# Patient Record
Sex: FEMALE | Race: WHITE | Employment: OTHER | URBAN - NONMETROPOLITAN AREA
[De-identification: names, ages, dates, MRNs, and addresses within clinical notes are randomized per-mention and may not be internally consistent; named-entity substitution may affect disease eponyms.]

---

## 2021-01-10 ENCOUNTER — APPOINTMENT (OUTPATIENT)
Dept: CT IMAGING | Age: 76
DRG: 853 | End: 2021-01-10
Payer: MEDICARE

## 2021-01-10 ENCOUNTER — HOSPITAL ENCOUNTER (INPATIENT)
Age: 76
LOS: 4 days | Discharge: HOME | DRG: 853 | End: 2021-01-14
Attending: FAMILY MEDICINE | Admitting: FAMILY MEDICINE
Payer: MEDICARE

## 2021-01-10 ENCOUNTER — APPOINTMENT (OUTPATIENT)
Dept: GENERAL RADIOLOGY | Age: 76
DRG: 853 | End: 2021-01-10
Payer: MEDICARE

## 2021-01-10 DIAGNOSIS — N39.0 URINARY TRACT INFECTION WITH HEMATURIA, SITE UNSPECIFIED: Primary | ICD-10-CM

## 2021-01-10 DIAGNOSIS — R41.82 ALTERED MENTAL STATUS, UNSPECIFIED ALTERED MENTAL STATUS TYPE: ICD-10-CM

## 2021-01-10 DIAGNOSIS — N23 RENAL COLIC: ICD-10-CM

## 2021-01-10 DIAGNOSIS — R50.9 FEVER, UNSPECIFIED FEVER CAUSE: ICD-10-CM

## 2021-01-10 DIAGNOSIS — R31.9 URINARY TRACT INFECTION WITH HEMATURIA, SITE UNSPECIFIED: Primary | ICD-10-CM

## 2021-01-10 PROBLEM — A41.9 SEPSIS (HCC): Status: ACTIVE | Noted: 2021-01-10

## 2021-01-10 LAB
ALBUMIN SERPL-MCNC: 4.2 G/DL (ref 3.5–5.1)
ALP BLD-CCNC: 172 U/L (ref 38–126)
ALT SERPL-CCNC: 97 U/L (ref 11–66)
ANION GAP SERPL CALCULATED.3IONS-SCNC: 14 MEQ/L (ref 8–16)
AST SERPL-CCNC: 116 U/L (ref 5–40)
BACTERIA: ABNORMAL
BILIRUB SERPL-MCNC: 0.7 MG/DL (ref 0.3–1.2)
BILIRUBIN URINE: NEGATIVE
BLOOD, URINE: ABNORMAL
BUN BLDV-MCNC: 30 MG/DL (ref 7–22)
CALCIUM SERPL-MCNC: 10.1 MG/DL (ref 8.5–10.5)
CASTS: ABNORMAL /LPF
CASTS: ABNORMAL /LPF
CHARACTER, URINE: ABNORMAL
CHLORIDE BLD-SCNC: 98 MEQ/L (ref 98–111)
CO2: 24 MEQ/L (ref 23–33)
COLOR: YELLOW
CREAT SERPL-MCNC: 1.6 MG/DL (ref 0.4–1.2)
CRYSTALS: ABNORMAL
EKG ATRIAL RATE: 109 BPM
EKG P AXIS: 71 DEGREES
EKG P-R INTERVAL: 140 MS
EKG Q-T INTERVAL: 302 MS
EKG QRS DURATION: 88 MS
EKG QTC CALCULATION (BAZETT): 406 MS
EKG R AXIS: 6 DEGREES
EKG T AXIS: 70 DEGREES
EKG VENTRICULAR RATE: 109 BPM
EPITHELIAL CELLS, UA: ABNORMAL /HPF
GFR SERPL CREATININE-BSD FRML MDRD: 31 ML/MIN/1.73M2
GLUCOSE BLD-MCNC: 113 MG/DL (ref 70–108)
GLUCOSE, URINE: NEGATIVE MG/DL
KETONES, URINE: NEGATIVE
LACTIC ACID, SEPSIS: 2.1 MMOL/L (ref 0.5–1.9)
LACTIC ACID, SEPSIS: 2.1 MMOL/L (ref 0.5–1.9)
LEUKOCYTE EST, POC: ABNORMAL
MISCELLANEOUS LAB TEST RESULT: ABNORMAL
NITRITE, URINE: NEGATIVE
OSMOLALITY CALCULATION: 279 MOSMOL/KG (ref 275–300)
PH UA: 5 (ref 5–9)
POTASSIUM REFLEX MAGNESIUM: 4.2 MEQ/L (ref 3.5–5.2)
PRO-BNP: 4074 PG/ML (ref 0–900)
PROCALCITONIN: 50.18 NG/ML (ref 0.01–0.09)
PROTEIN UA: 30 MG/DL
RBC URINE: ABNORMAL /HPF
RENAL EPITHELIAL, UA: ABNORMAL
SARS-COV-2, NAAT: NOT DETECTED
SCAN OF BLOOD SMEAR: NORMAL
SODIUM BLD-SCNC: 136 MEQ/L (ref 135–145)
SPECIFIC GRAVITY UA: 1.01 (ref 1–1.03)
TOTAL PROTEIN: 7 G/DL (ref 6.1–8)
TROPONIN T: < 0.01 NG/ML
UROBILINOGEN, URINE: 0.2 EU/DL (ref 0–1)
WBC UA: ABNORMAL /HPF
YEAST: ABNORMAL

## 2021-01-10 PROCEDURE — 83880 ASSAY OF NATRIURETIC PEPTIDE: CPT

## 2021-01-10 PROCEDURE — 81001 URINALYSIS AUTO W/SCOPE: CPT

## 2021-01-10 PROCEDURE — 87077 CULTURE AEROBIC IDENTIFY: CPT

## 2021-01-10 PROCEDURE — 96375 TX/PRO/DX INJ NEW DRUG ADDON: CPT

## 2021-01-10 PROCEDURE — 84145 PROCALCITONIN (PCT): CPT

## 2021-01-10 PROCEDURE — 87186 SC STD MICRODIL/AGAR DIL: CPT

## 2021-01-10 PROCEDURE — 6370000000 HC RX 637 (ALT 250 FOR IP): Performed by: FAMILY MEDICINE

## 2021-01-10 PROCEDURE — 83605 ASSAY OF LACTIC ACID: CPT

## 2021-01-10 PROCEDURE — 93005 ELECTROCARDIOGRAM TRACING: CPT | Performed by: FAMILY MEDICINE

## 2021-01-10 PROCEDURE — 96365 THER/PROPH/DIAG IV INF INIT: CPT

## 2021-01-10 PROCEDURE — 87040 BLOOD CULTURE FOR BACTERIA: CPT

## 2021-01-10 PROCEDURE — 2580000003 HC RX 258: Performed by: STUDENT IN AN ORGANIZED HEALTH CARE EDUCATION/TRAINING PROGRAM

## 2021-01-10 PROCEDURE — 87086 URINE CULTURE/COLONY COUNT: CPT

## 2021-01-10 PROCEDURE — 6360000002 HC RX W HCPCS: Performed by: FAMILY MEDICINE

## 2021-01-10 PROCEDURE — 74176 CT ABD & PELVIS W/O CONTRAST: CPT

## 2021-01-10 PROCEDURE — 99285 EMERGENCY DEPT VISIT HI MDM: CPT

## 2021-01-10 PROCEDURE — 2580000003 HC RX 258: Performed by: FAMILY MEDICINE

## 2021-01-10 PROCEDURE — 36415 COLL VENOUS BLD VENIPUNCTURE: CPT

## 2021-01-10 PROCEDURE — 70450 CT HEAD/BRAIN W/O DYE: CPT

## 2021-01-10 PROCEDURE — 99223 1ST HOSP IP/OBS HIGH 75: CPT | Performed by: FAMILY MEDICINE

## 2021-01-10 PROCEDURE — 2140000000 HC CCU INTERMEDIATE R&B

## 2021-01-10 PROCEDURE — 87801 DETECT AGNT MULT DNA AMPLI: CPT

## 2021-01-10 PROCEDURE — 96367 TX/PROPH/DG ADDL SEQ IV INF: CPT

## 2021-01-10 PROCEDURE — 84484 ASSAY OF TROPONIN QUANT: CPT

## 2021-01-10 PROCEDURE — 80053 COMPREHEN METABOLIC PANEL: CPT

## 2021-01-10 PROCEDURE — 71045 X-RAY EXAM CHEST 1 VIEW: CPT

## 2021-01-10 PROCEDURE — 85025 COMPLETE CBC W/AUTO DIFF WBC: CPT

## 2021-01-10 PROCEDURE — U0002 COVID-19 LAB TEST NON-CDC: HCPCS

## 2021-01-10 RX ORDER — MODAFINIL 200 MG/1
200 TABLET ORAL DAILY
COMMUNITY

## 2021-01-10 RX ORDER — SODIUM CHLORIDE 9 MG/ML
INJECTION, SOLUTION INTRAVENOUS CONTINUOUS
Status: DISCONTINUED | OUTPATIENT
Start: 2021-01-11 | End: 2021-01-14 | Stop reason: HOSPADM

## 2021-01-10 RX ORDER — 0.9 % SODIUM CHLORIDE 0.9 %
1000 INTRAVENOUS SOLUTION INTRAVENOUS ONCE
Status: COMPLETED | OUTPATIENT
Start: 2021-01-10 | End: 2021-01-10

## 2021-01-10 RX ORDER — SODIUM CHLORIDE 0.9 % (FLUSH) 0.9 %
10 SYRINGE (ML) INJECTION EVERY 12 HOURS SCHEDULED
Status: DISCONTINUED | OUTPATIENT
Start: 2021-01-11 | End: 2021-01-14 | Stop reason: HOSPADM

## 2021-01-10 RX ORDER — 0.9 % SODIUM CHLORIDE 0.9 %
30 INTRAVENOUS SOLUTION INTRAVENOUS ONCE
Status: COMPLETED | OUTPATIENT
Start: 2021-01-10 | End: 2021-01-10

## 2021-01-10 RX ORDER — SODIUM CHLORIDE 0.9 % (FLUSH) 0.9 %
10 SYRINGE (ML) INJECTION PRN
Status: DISCONTINUED | OUTPATIENT
Start: 2021-01-10 | End: 2021-01-14 | Stop reason: HOSPADM

## 2021-01-10 RX ORDER — ARMODAFINIL 250 MG/1
250 TABLET ORAL DAILY
Status: ON HOLD | COMMUNITY
Start: 2020-12-15 | End: 2021-01-14 | Stop reason: HOSPADM

## 2021-01-10 RX ORDER — ACETAMINOPHEN 325 MG/1
650 TABLET ORAL EVERY 6 HOURS PRN
Status: DISCONTINUED | OUTPATIENT
Start: 2021-01-10 | End: 2021-01-14 | Stop reason: HOSPADM

## 2021-01-10 RX ORDER — KETOROLAC TROMETHAMINE 30 MG/ML
15 INJECTION, SOLUTION INTRAMUSCULAR; INTRAVENOUS ONCE
Status: COMPLETED | OUTPATIENT
Start: 2021-01-10 | End: 2021-01-10

## 2021-01-10 RX ORDER — ACETAMINOPHEN 650 MG/1
650 SUPPOSITORY RECTAL EVERY 6 HOURS PRN
Status: DISCONTINUED | OUTPATIENT
Start: 2021-01-10 | End: 2021-01-14 | Stop reason: HOSPADM

## 2021-01-10 RX ORDER — ACETAMINOPHEN 650 MG/1
650 SUPPOSITORY RECTAL ONCE
Status: COMPLETED | OUTPATIENT
Start: 2021-01-10 | End: 2021-01-10

## 2021-01-10 RX ORDER — LEVOTHYROXINE SODIUM 125 UG/1
125 TABLET ORAL DAILY
COMMUNITY
Start: 2020-12-15

## 2021-01-10 RX ADMIN — PIPERACILLIN AND TAZOBACTAM 3.38 G: 3; .375 INJECTION, POWDER, LYOPHILIZED, FOR SOLUTION INTRAVENOUS at 18:46

## 2021-01-10 RX ADMIN — SODIUM CHLORIDE: 9 INJECTION, SOLUTION INTRAVENOUS at 23:43

## 2021-01-10 RX ADMIN — SODIUM CHLORIDE 1000 ML: 9 INJECTION, SOLUTION INTRAVENOUS at 17:45

## 2021-01-10 RX ADMIN — KETOROLAC TROMETHAMINE 15 MG: 30 INJECTION, SOLUTION INTRAMUSCULAR at 20:26

## 2021-01-10 RX ADMIN — ACETAMINOPHEN 650 MG: 650 SUPPOSITORY RECTAL at 18:03

## 2021-01-10 RX ADMIN — SODIUM CHLORIDE 2112 ML: 9 INJECTION, SOLUTION INTRAVENOUS at 20:13

## 2021-01-10 RX ADMIN — VANCOMYCIN HYDROCHLORIDE 1000 MG: 1 INJECTION, POWDER, LYOPHILIZED, FOR SOLUTION INTRAVENOUS at 19:27

## 2021-01-10 ASSESSMENT — PAIN SCALES - GENERAL: PAINLEVEL_OUTOF10: 0

## 2021-01-10 ASSESSMENT — PAIN DESCRIPTION - FREQUENCY: FREQUENCY: INTERMITTENT

## 2021-01-10 ASSESSMENT — PAIN DESCRIPTION - DESCRIPTORS: DESCRIPTORS: ACHING

## 2021-01-10 NOTE — ED PROVIDER NOTES
[address] COMPLAINT    Chief Complaint   Patient presents with    Fever    Emesis       HPI    Miguel Leonard is a 77 y.o. female who presents with generalized weakness, confusion and fever since the onset sometime between last night at 9 am till this afternoon. She called off going to [employer] (speaking with her daughter James Quan [phone]) due to not feeling well. When she and her other family members went to check on her around 4pm, she was febrile, somnolent and confused. Three days prior, per  Her daughter, pt complained of a vague headache(?), feeling left-sided pain and vomiting multiple times without obvious endorsement of abdominal pain, chest pain or sob. There has not been any antecedent infection or URI symptoms. The duration has been constant since the onset. The generalized weakness may be associated with unknown infection or stroke. No aggravating or alleviating factors. REVIEW OF SYSTEMS    Cardiac: No chest pain or palpitations  Respiratory: No shortness of breath or new cough  General: +fevers and confusion  Neuro: +increased confusion, +headache  : No dysuria or hematuria  GI: +vomiting; denies abdominal pain or diarrhea  See HPI for further details. All other systems reviewed and are negative. PAST MEDICAL OR SURGICAL HISTORY    Past Medical History:   Diagnosis Date    Anemia     Hypotension     Kidney stone     Thyroid disease      Past Surgical History:   Procedure Laterality Date    KIDNEY STONE SURGERY  5-2015    cystoscopy with right ureteroscopy, laser lithotripsy, basket retrieval of stone fragments and placement of right ureteral stent       CURRENT MEDICATIONS    Current Outpatient Rx   Medication Sig Dispense Refill    Armodafinil 250 MG TABS Take 250 mg by mouth daily.  LEVOXYL 125 MCG tablet Take 125 mcg by mouth daily      modafinil (PROVIGIL) 200 MG tablet Take 200 mg by mouth daily.  Multiple Vitamin (MULTI-VITAMINS PO) Take by mouth      IRON PO Take by mouth         ALLERGIES    No Known Allergies    FAMILY OR SOCIAL HISTORY    History reviewed. No pertinent family history. Social History     Socioeconomic History    Marital status:      Spouse name: Not on file    Number of children: Not on file    Years of education: Not on file    Highest education level: Not on file   Occupational History    Not on file   Social Needs    Financial resource strain: Not on file    Food insecurity     Worry: Not on file     Inability: Not on file    Transportation needs     Medical: Not on file     Non-medical: Not on file   Tobacco Use    Smoking status: Never Smoker    Smokeless tobacco: Never Used   Substance and Sexual Activity    Alcohol use:  Yes    Drug use: No    Sexual activity: Not on file   Lifestyle    Physical activity     Days per week: Not on file     Minutes per session: Not on file    Stress: Not on file   Relationships    Social connections     Talks on phone: Not on file     Gets together: Not on file     Attends Yazidism service: Not on file     Active member of club or organization: Not on file     Attends meetings of clubs or organizations: Not on file     Relationship status: Not on file    Intimate partner violence     Fear of current or ex partner: Not on file     Emotionally abused: Not on file     Physically abused: Not on file     Forced sexual activity: Not on file   Other Topics Concern    Not on file   Social History Narrative    Not on file       PHYSICAL EXAM    VITAL SIGNS: BP (!) 91/56   Pulse 102   Temp 102.6 °F (39.2 °C) (Rectal)   Resp 20   Wt 155 lb 1.6 oz (70.4 kg)   SpO2 96%   BMI 27.47 kg/m²   Constitutional:  Well developed, well nourished, severe acute distress  Eyes:  Pupils equally pinpoint and minimally reactive to light, sclera nonicteric  HENT:  atraumatic, dry mucous membranes  NECK: Normal range of motion, no JVD Respiratory:  No respiratory distress, normal breath sounds, no wheezing, +shallow breathing  Cardiovascular:  tachycardic rate, normal rhythm, no murmurs   GI:  Soft, nondistended, normal bowel sounds, nontender  Musculoskeletal:  No edema, no acute deformities   Integument:  Skin is cool and mottled, and dry, no obvious rash    Vascular: Radial and DP pulses 2+ equal bilaterally  Neurologic: awake, but somnolent; not oriented x3, does open eyes, handgrip is 5/5 bilaterally, cannot assess other neuro findings such as cerebellar functionality or speech pattern      Labs Reviewed   LACTATE, SEPSIS - Abnormal; Notable for the following components:       Result Value    Lactic Acid, Sepsis 2.1 (*)     All other components within normal limits   LACTATE, SEPSIS - Abnormal; Notable for the following components:    Lactic Acid, Sepsis 2.1 (*)     All other components within normal limits   CBC WITH AUTO DIFFERENTIAL - Abnormal; Notable for the following components:    WBC 1.3 (*)     MCHC 31.5 (*)     RDW-SD 46.2 (*)     All other components within normal limits   COMPREHENSIVE METABOLIC PANEL W/ REFLEX TO MG FOR LOW K - Abnormal; Notable for the following components:    Glucose 113 (*)     CREATININE 1.6 (*)     BUN 30 (*)      (*)     Alkaline Phosphatase 172 (*)     ALT 97 (*)     All other components within normal limits   GLOMERULAR FILTRATION RATE, ESTIMATED - Abnormal; Notable for the following components:    Est, Glom Filt Rate 31 (*)     All other components within normal limits   BRAIN NATRIURETIC PEPTIDE - Abnormal; Notable for the following components:    Pro-BNP 4074.0 (*)     All other components within normal limits   MICROSCOPIC URINALYSIS - Abnormal; Notable for the following components:    Blood, Urine MODERATE (*)     Protein, UA 30 (*)     Leukocytes, UA MODERATE (*)     Character, Urine CLOUDY (*)     All other components within normal limits   CULTURE, BLOOD 1   CULTURE, BLOOD 2 CULTURE, URINE   COVID-19   ANION GAP   OSMOLALITY   TROPONIN   SCAN OF BLOOD SMEAR   PROCALCITONIN       EKG  (Interpreted by me)  EKG Interpretation. EKG Interpretation    Interpreted by emergency department physician on 1/10/21 17:11    Rhythm: normal sinus   Rate: 109 bpm  Axis: normal  Ectopy: none  Conduction: normal  ST Segments: no acute change  T Waves: no acute change  Q Waves: none    Clinical Impression: non-specific EKG      RADIOLOGY/PROCEDURES    CT ABDOMEN PELVIS WO CONTRAST Additional Contrast? None   Final Result   1. Obstructive 6 mm calculus at the left ureterovesical junction. Mild    to moderate left hydronephrosis and moderate left perinephric stranding. 2.  Extensive cholelithiasis. Gallbladder wall thickening and surrounding    fluid may reflect acute cholecystitis. 3.  Left hemicolon diverticulosis without diverticulitis. 4.  Other chronic findings above. This document has been electronically signed by: Eloy Verde MD on    01/10/2021 10:22 PM      All CT scans at this facility use dose modulation, iterative    reconstruction, and/or weight-based   dosing when appropriate to reduce radiation dose to as low as reasonably    achievable. XR CHEST PORTABLE   Final Result   1. Mild cardiomegaly. No effusion. 2. Minimal atelectasis/pneumonia right lung base inferomedially. **This report has been created using voice recognition software. It may contain minor errors which are inherent in voice recognition technology. **      Final report electronically signed by Dr. Yoon Almanza on 1/10/2021 5:55 PM      CT Head WO Contrast   Final Result   No acute intracranial process. **This report has been created using voice recognition software. It may contain minor errors which are inherent in voice recognition technology. **      Final report electronically signed by Dr. Yoon Almanza on 1/10/2021 5:54 PM          ED [facility] PT has no obvious focal neuro deficits. After receiving 3L of fluids, and antibiotics her mentation has cleared up very much. I thought she might have been coming out of a narcoleptic episode prior to her arrival. In any event, pt claims that her BP is usually low ( mm Hg systolic) and her current BP of 90s is not too far from her baseline. I do not think given her condition that central line or access in indicated at this time. I discussed case with [facility] from urology' team ( [address] [place] [address]) and discussed pt's left 6mm UVJ stone. Pt will need intervention in the morning. Pt's CT also pointed out cholelithiasis but pt does NOT have any RUQ tenderness or Segura's sign. Pt will be admitted to medicine for further management as inpatient with urology consultation.      Joey Rojas MD  01/10/21 0832

## 2021-01-10 NOTE — ED TRIAGE NOTES
Pt to ED via EMS w/rprts of altered mental status, fever and vomiting. Daughter in law; Mayte rprts pt had a bout of vomiting two-three days ago. Had started to feel better until last night when the vomiting continued. This morning spoke to pt on the phone at 0930 and at that time pt rprts not feeling well but was responding to questions appropriately. Called again later in the day and could no longer understand pt. Pt presents to ED alert to self. Groaning, moaning and dry heaving. EMS rprts light green bile emesis noted. Treated w/4mg zofran on route and started IV fluids. . Pt placed on cardiac monitor and in gown. EKG completed at bedside.

## 2021-01-11 ENCOUNTER — APPOINTMENT (OUTPATIENT)
Dept: ULTRASOUND IMAGING | Age: 76
DRG: 853 | End: 2021-01-11
Payer: MEDICARE

## 2021-01-11 ENCOUNTER — APPOINTMENT (OUTPATIENT)
Dept: GENERAL RADIOLOGY | Age: 76
DRG: 853 | End: 2021-01-11
Payer: MEDICARE

## 2021-01-11 ENCOUNTER — ANESTHESIA EVENT (OUTPATIENT)
Dept: OPERATING ROOM | Age: 76
DRG: 853 | End: 2021-01-11
Payer: MEDICARE

## 2021-01-11 ENCOUNTER — ANESTHESIA (OUTPATIENT)
Dept: OPERATING ROOM | Age: 76
DRG: 853 | End: 2021-01-11
Payer: MEDICARE

## 2021-01-11 VITALS — DIASTOLIC BLOOD PRESSURE: 40 MMHG | SYSTOLIC BLOOD PRESSURE: 82 MMHG | OXYGEN SATURATION: 94 %

## 2021-01-11 LAB
ABO: NORMAL
ACINETOBACTER BAUMANNII FILM ARRAY: NOT DETECTED
ALBUMIN SERPL-MCNC: 3 G/DL (ref 3.5–5.1)
ALP BLD-CCNC: 160 U/L (ref 38–126)
ALT SERPL-CCNC: 203 U/L (ref 11–66)
ANION GAP SERPL CALCULATED.3IONS-SCNC: 8 MEQ/L (ref 8–16)
ANTIBODY SCREEN: NORMAL
AST SERPL-CCNC: 221 U/L (ref 5–40)
BASOPHILS # BLD: 0 %
BASOPHILS # BLD: 0.2 %
BASOPHILS ABSOLUTE: 0 THOU/MM3 (ref 0–0.1)
BASOPHILS ABSOLUTE: 0 THOU/MM3 (ref 0–0.1)
BILIRUB SERPL-MCNC: 0.7 MG/DL (ref 0.3–1.2)
BOTTLE TYPE: ABNORMAL
BUN BLDV-MCNC: 37 MG/DL (ref 7–22)
CALCIUM SERPL-MCNC: 7.8 MG/DL (ref 8.5–10.5)
CANDIDA ALBICANS FILM ARRAY: NOT DETECTED
CANDIDA GLABRATA FILM ARRAY: NOT DETECTED
CANDIDA KRUSEI FILM ARRAY: NOT DETECTED
CANDIDA PARAPSILOSIS FILM ARRAY: NOT DETECTED
CANDIDA TROPICALIS FILM ARRAY: NOT DETECTED
CARBAPENEM RESITANT FILM ARRAY: NOT DETECTED
CHLORIDE BLD-SCNC: 110 MEQ/L (ref 98–111)
CO2: 18 MEQ/L (ref 23–33)
CORTISOL COLLECTION INFO: NORMAL
CORTISOL: 50.61 UG/DL
CREAT SERPL-MCNC: 2.1 MG/DL (ref 0.4–1.2)
DIFFERENTIAL TYPE: ABNORMAL
ENTERBACTER CLOACAE FILM ARRAY: NOT DETECTED
ENTERBACTERIACEAE FILM ARRAY: DETECTED
ENTEROCOCCUS FILM ARRAY: NOT DETECTED
EOSINOPHIL # BLD: 0 %
EOSINOPHIL # BLD: 0 %
EOSINOPHILS ABSOLUTE: 0 THOU/MM3 (ref 0–0.4)
EOSINOPHILS ABSOLUTE: 0 THOU/MM3 (ref 0–0.4)
ERYTHROCYTE [DISTWIDTH] IN BLOOD BY AUTOMATED COUNT: 13.7 % (ref 11.5–14.5)
ERYTHROCYTE [DISTWIDTH] IN BLOOD BY AUTOMATED COUNT: 14.2 % (ref 11.5–14.5)
ERYTHROCYTE [DISTWIDTH] IN BLOOD BY AUTOMATED COUNT: 46.2 FL (ref 35–45)
ERYTHROCYTE [DISTWIDTH] IN BLOOD BY AUTOMATED COUNT: 48.1 FL (ref 35–45)
ESCHERICHIA COLI FILM ARRAY: DETECTED
GFR SERPL CREATININE-BSD FRML MDRD: 23 ML/MIN/1.73M2
GLUCOSE BLD-MCNC: 110 MG/DL (ref 70–108)
HAEMOPHILUS INFLUENZA FILM ARRAY: NOT DETECTED
HAV IGM SER IA-ACNC: NEGATIVE
HCT VFR BLD CALC: 33.6 % (ref 37–47)
HCT VFR BLD CALC: 40.9 % (ref 37–47)
HEMOGLOBIN: 10.6 GM/DL (ref 12–16)
HEMOGLOBIN: 12.9 GM/DL (ref 12–16)
HEPATITIS B CORE IGM ANTIBODY: NEGATIVE
HEPATITIS B SURFACE ANTIGEN: NEGATIVE
HEPATITIS C ANTIBODY: NEGATIVE
IMMATURE GRANS (ABS): 0 THOU/MM3 (ref 0–0.07)
IMMATURE GRANS (ABS): 0.96 THOU/MM3 (ref 0–0.07)
IMMATURE GRANULOCYTES: 0 %
IMMATURE GRANULOCYTES: 4.1 %
KLEBSIELLA OXYTOCA FILM ARRAY: NOT DETECTED
KLEBSIELLA PNEUMONIAE FILM ARRAY: NOT DETECTED
LACTIC ACID, SEPSIS: 1.6 MMOL/L (ref 0.5–1.9)
LISTERIA MONOCYTOGENES FILM ARRAY: NOT DETECTED
LYMPHOCYTES # BLD: 2.1 %
LYMPHOCYTES # BLD: 33.6 %
LYMPHOCYTES ABSOLUTE: 0.4 THOU/MM3 (ref 1–4.8)
LYMPHOCYTES ABSOLUTE: 0.5 THOU/MM3 (ref 1–4.8)
MCH RBC QN AUTO: 28.9 PG (ref 26–33)
MCH RBC QN AUTO: 29.1 PG (ref 26–33)
MCHC RBC AUTO-ENTMCNC: 31.5 GM/DL (ref 32.2–35.5)
MCHC RBC AUTO-ENTMCNC: 31.5 GM/DL (ref 32.2–35.5)
MCV RBC AUTO: 91.7 FL (ref 81–99)
MCV RBC AUTO: 92.3 FL (ref 81–99)
METHICILLIN RESISTANT FILM ARRAY: ABNORMAL
MONOCYTES # BLD: 0.8 %
MONOCYTES # BLD: 2.6 %
MONOCYTES ABSOLUTE: 0 THOU/MM3 (ref 0.4–1.3)
MONOCYTES ABSOLUTE: 0.6 THOU/MM3 (ref 0.4–1.3)
MRSA SCREEN RT-PCR: NEGATIVE
NEISSERIA MENIGITIDIS FILM ARRAY: NOT DETECTED
NUCLEATED RED BLOOD CELLS: 0 /100 WBC
NUCLEATED RED BLOOD CELLS: 0 /100 WBC
ORGANISM: ABNORMAL
PATHOLOGIST REVIEW: ABNORMAL
PATHOLOGIST REVIEW: ABNORMAL
PLATELET # BLD: 151 THOU/MM3 (ref 130–400)
PLATELET # BLD: 173 THOU/MM3 (ref 130–400)
PLATELET ESTIMATE: ADEQUATE
PLATELET ESTIMATE: ADEQUATE
PMV BLD AUTO: 10.1 FL (ref 9.4–12.4)
PMV BLD AUTO: 10.5 FL (ref 9.4–12.4)
POTASSIUM REFLEX MAGNESIUM: 4 MEQ/L (ref 3.5–5.2)
PROTEUS FILM ARRAY: NOT DETECTED
PSEUDOMONAS AERUGINOSA FILM ARRAY: NOT DETECTED
RBC # BLD: 3.64 MILL/MM3 (ref 4.2–5.4)
RBC # BLD: 4.46 MILL/MM3 (ref 4.2–5.4)
RH FACTOR: NORMAL
SCAN OF BLOOD SMEAR: NORMAL
SEG NEUTROPHILS: 65.6 %
SEG NEUTROPHILS: 91 %
SEGMENTED NEUTROPHILS ABSOLUTE COUNT: 0.9 THOU/MM3 (ref 1.8–7.7)
SEGMENTED NEUTROPHILS ABSOLUTE COUNT: 21.3 THOU/MM3 (ref 1.8–7.7)
SERRATIA MARCESCENS FILM ARRAY: NOT DETECTED
SODIUM BLD-SCNC: 136 MEQ/L (ref 135–145)
SOURCE OF BLOOD CULTURE: ABNORMAL
STAPH AUREUS FILM ARRAY: NOT DETECTED
STAPHYLOCOCCUS FILM ARRAY: NOT DETECTED
STREP AGALACTIAE FILM ARRAY: NOT DETECTED
STREP PNEUMONIAE FILM ARRAY: NOT DETECTED
STREP PYOCGENES FILM ARRAY: NOT DETECTED
STREPTOCOCCUS FILM ARRAY: NOT DETECTED
TOTAL PROTEIN: 5.3 G/DL (ref 6.1–8)
URINE CULTURE, ROUTINE: ABNORMAL
VANCOMYCIN RESISTANT ENTEROCOCCUS: NEGATIVE
VANCOMYCIN RESISTANT FILM ARRAY: ABNORMAL
WBC # BLD: 1.3 THOU/MM3 (ref 4.8–10.8)
WBC # BLD: 23.4 THOU/MM3 (ref 4.8–10.8)

## 2021-01-11 PROCEDURE — 71045 X-RAY EXAM CHEST 1 VIEW: CPT

## 2021-01-11 PROCEDURE — APPSS30 APP SPLIT SHARED TIME 16-30 MINUTES: Performed by: NURSE PRACTITIONER

## 2021-01-11 PROCEDURE — 82533 TOTAL CORTISOL: CPT

## 2021-01-11 PROCEDURE — C2617 STENT, NON-COR, TEM W/O DEL: HCPCS | Performed by: UROLOGY

## 2021-01-11 PROCEDURE — 86900 BLOOD TYPING SEROLOGIC ABO: CPT

## 2021-01-11 PROCEDURE — 76705 ECHO EXAM OF ABDOMEN: CPT

## 2021-01-11 PROCEDURE — 92610 EVALUATE SWALLOWING FUNCTION: CPT

## 2021-01-11 PROCEDURE — 87500 VANOMYCIN DNA AMP PROBE: CPT

## 2021-01-11 PROCEDURE — 3700000001 HC ADD 15 MINUTES (ANESTHESIA): Performed by: UROLOGY

## 2021-01-11 PROCEDURE — 2700000000 HC OXYGEN THERAPY PER DAY

## 2021-01-11 PROCEDURE — 6360000002 HC RX W HCPCS: Performed by: FAMILY MEDICINE

## 2021-01-11 PROCEDURE — 2000000000 HC ICU R&B

## 2021-01-11 PROCEDURE — 87081 CULTURE SCREEN ONLY: CPT

## 2021-01-11 PROCEDURE — 83605 ASSAY OF LACTIC ACID: CPT

## 2021-01-11 PROCEDURE — 80074 ACUTE HEPATITIS PANEL: CPT

## 2021-01-11 PROCEDURE — 36415 COLL VENOUS BLD VENIPUNCTURE: CPT

## 2021-01-11 PROCEDURE — 3700000000 HC ANESTHESIA ATTENDED CARE: Performed by: UROLOGY

## 2021-01-11 PROCEDURE — 2580000003 HC RX 258: Performed by: STUDENT IN AN ORGANIZED HEALTH CARE EDUCATION/TRAINING PROGRAM

## 2021-01-11 PROCEDURE — C1769 GUIDE WIRE: HCPCS | Performed by: UROLOGY

## 2021-01-11 PROCEDURE — 87186 SC STD MICRODIL/AGAR DIL: CPT

## 2021-01-11 PROCEDURE — 94761 N-INVAS EAR/PLS OXIMETRY MLT: CPT

## 2021-01-11 PROCEDURE — 87086 URINE CULTURE/COLONY COUNT: CPT

## 2021-01-11 PROCEDURE — 2500000003 HC RX 250 WO HCPCS: Performed by: NURSE ANESTHETIST, CERTIFIED REGISTERED

## 2021-01-11 PROCEDURE — 3600000013 HC SURGERY LEVEL 3 ADDTL 15MIN: Performed by: UROLOGY

## 2021-01-11 PROCEDURE — 6360000002 HC RX W HCPCS: Performed by: NURSE ANESTHETIST, CERTIFIED REGISTERED

## 2021-01-11 PROCEDURE — 6360000002 HC RX W HCPCS: Performed by: STUDENT IN AN ORGANIZED HEALTH CARE EDUCATION/TRAINING PROGRAM

## 2021-01-11 PROCEDURE — 0T778DZ DILATION OF LEFT URETER WITH INTRALUMINAL DEVICE, VIA NATURAL OR ARTIFICIAL OPENING ENDOSCOPIC: ICD-10-PCS | Performed by: UROLOGY

## 2021-01-11 PROCEDURE — 2500000003 HC RX 250 WO HCPCS: Performed by: NURSE PRACTITIONER

## 2021-01-11 PROCEDURE — 85025 COMPLETE CBC W/AUTO DIFF WBC: CPT

## 2021-01-11 PROCEDURE — 3600000003 HC SURGERY LEVEL 3 BASE: Performed by: UROLOGY

## 2021-01-11 PROCEDURE — 87641 MR-STAPH DNA AMP PROBE: CPT

## 2021-01-11 PROCEDURE — 86901 BLOOD TYPING SEROLOGIC RH(D): CPT

## 2021-01-11 PROCEDURE — 87077 CULTURE AEROBIC IDENTIFY: CPT

## 2021-01-11 PROCEDURE — 99232 SBSQ HOSP IP/OBS MODERATE 35: CPT | Performed by: INTERNAL MEDICINE

## 2021-01-11 PROCEDURE — 86850 RBC ANTIBODY SCREEN: CPT

## 2021-01-11 PROCEDURE — 6370000000 HC RX 637 (ALT 250 FOR IP): Performed by: STUDENT IN AN ORGANIZED HEALTH CARE EDUCATION/TRAINING PROGRAM

## 2021-01-11 PROCEDURE — 80053 COMPREHEN METABOLIC PANEL: CPT

## 2021-01-11 PROCEDURE — 2709999900 HC NON-CHARGEABLE SUPPLY: Performed by: UROLOGY

## 2021-01-11 PROCEDURE — 02HV33Z INSERTION OF INFUSION DEVICE INTO SUPERIOR VENA CAVA, PERCUTANEOUS APPROACH: ICD-10-PCS | Performed by: FAMILY MEDICINE

## 2021-01-11 DEVICE — URETERAL STENT
Type: IMPLANTABLE DEVICE | Site: URETER | Status: FUNCTIONAL
Brand: PERCUFLEX™ PLUS

## 2021-01-11 RX ORDER — FENTANYL CITRATE 50 UG/ML
25 INJECTION, SOLUTION INTRAMUSCULAR; INTRAVENOUS EVERY 5 MIN PRN
Status: DISCONTINUED | OUTPATIENT
Start: 2021-01-11 | End: 2021-01-11 | Stop reason: HOSPADM

## 2021-01-11 RX ORDER — METOCLOPRAMIDE HYDROCHLORIDE 5 MG/ML
10 INJECTION INTRAMUSCULAR; INTRAVENOUS
Status: DISCONTINUED | OUTPATIENT
Start: 2021-01-11 | End: 2021-01-11 | Stop reason: HOSPADM

## 2021-01-11 RX ORDER — LEVOTHYROXINE SODIUM 0.12 MG/1
125 TABLET ORAL DAILY
Status: DISCONTINUED | OUTPATIENT
Start: 2021-01-11 | End: 2021-01-14 | Stop reason: HOSPADM

## 2021-01-11 RX ORDER — ONDANSETRON 2 MG/ML
INJECTION INTRAMUSCULAR; INTRAVENOUS PRN
Status: DISCONTINUED | OUTPATIENT
Start: 2021-01-11 | End: 2021-01-11 | Stop reason: SDUPTHER

## 2021-01-11 RX ORDER — FENTANYL CITRATE 50 UG/ML
25 INJECTION, SOLUTION INTRAMUSCULAR; INTRAVENOUS ONCE
Status: COMPLETED | OUTPATIENT
Start: 2021-01-11 | End: 2021-01-11

## 2021-01-11 RX ORDER — DIPHENHYDRAMINE HYDROCHLORIDE 50 MG/ML
12.5 INJECTION INTRAMUSCULAR; INTRAVENOUS
Status: DISCONTINUED | OUTPATIENT
Start: 2021-01-11 | End: 2021-01-11 | Stop reason: HOSPADM

## 2021-01-11 RX ORDER — LABETALOL 20 MG/4 ML (5 MG/ML) INTRAVENOUS SYRINGE
5 EVERY 10 MIN PRN
Status: DISCONTINUED | OUTPATIENT
Start: 2021-01-11 | End: 2021-01-11 | Stop reason: HOSPADM

## 2021-01-11 RX ORDER — MEPERIDINE HYDROCHLORIDE 25 MG/ML
12.5 INJECTION INTRAMUSCULAR; INTRAVENOUS; SUBCUTANEOUS EVERY 5 MIN PRN
Status: DISCONTINUED | OUTPATIENT
Start: 2021-01-11 | End: 2021-01-11 | Stop reason: HOSPADM

## 2021-01-11 RX ORDER — ATROPA BELLADONNA AND OPIUM 16.2; 3 MG/1; MG/1
30 SUPPOSITORY RECTAL EVERY 8 HOURS PRN
Status: DISCONTINUED | OUTPATIENT
Start: 2021-01-11 | End: 2021-01-14 | Stop reason: HOSPADM

## 2021-01-11 RX ORDER — PROPOFOL 10 MG/ML
INJECTION, EMULSION INTRAVENOUS PRN
Status: DISCONTINUED | OUTPATIENT
Start: 2021-01-11 | End: 2021-01-11 | Stop reason: SDUPTHER

## 2021-01-11 RX ORDER — PROMETHAZINE HYDROCHLORIDE 25 MG/ML
12.5 INJECTION, SOLUTION INTRAMUSCULAR; INTRAVENOUS
Status: DISCONTINUED | OUTPATIENT
Start: 2021-01-11 | End: 2021-01-11 | Stop reason: HOSPADM

## 2021-01-11 RX ORDER — FENTANYL CITRATE 50 UG/ML
50 INJECTION, SOLUTION INTRAMUSCULAR; INTRAVENOUS EVERY 5 MIN PRN
Status: DISCONTINUED | OUTPATIENT
Start: 2021-01-11 | End: 2021-01-11 | Stop reason: HOSPADM

## 2021-01-11 RX ORDER — OXYBUTYNIN CHLORIDE 5 MG/1
5 TABLET ORAL 3 TIMES DAILY PRN
Status: DISCONTINUED | OUTPATIENT
Start: 2021-01-11 | End: 2021-01-14 | Stop reason: HOSPADM

## 2021-01-11 RX ORDER — LIDOCAINE HCL/PF 100 MG/5ML
SYRINGE (ML) INJECTION PRN
Status: DISCONTINUED | OUTPATIENT
Start: 2021-01-11 | End: 2021-01-11 | Stop reason: SDUPTHER

## 2021-01-11 RX ORDER — DOCUSATE SODIUM 100 MG/1
100 CAPSULE, LIQUID FILLED ORAL DAILY
Status: DISCONTINUED | OUTPATIENT
Start: 2021-01-11 | End: 2021-01-14 | Stop reason: HOSPADM

## 2021-01-11 RX ORDER — DEXAMETHASONE SODIUM PHOSPHATE 10 MG/ML
INJECTION, EMULSION INTRAMUSCULAR; INTRAVENOUS PRN
Status: DISCONTINUED | OUTPATIENT
Start: 2021-01-11 | End: 2021-01-11 | Stop reason: SDUPTHER

## 2021-01-11 RX ADMIN — DEXAMETHASONE SODIUM PHOSPHATE 10 MG: 10 INJECTION, EMULSION INTRAMUSCULAR; INTRAVENOUS at 02:32

## 2021-01-11 RX ADMIN — BISACODYL 10 MG: 5 TABLET ORAL at 14:10

## 2021-01-11 RX ADMIN — SODIUM CHLORIDE: 9 INJECTION, SOLUTION INTRAVENOUS at 07:00

## 2021-01-11 RX ADMIN — PIPERACILLIN AND TAZOBACTAM 3.38 G: 3; .375 INJECTION, POWDER, LYOPHILIZED, FOR SOLUTION INTRAVENOUS at 04:30

## 2021-01-11 RX ADMIN — Medication 40 MG: at 02:20

## 2021-01-11 RX ADMIN — LEVOTHYROXINE SODIUM 125 MCG: 125 TABLET ORAL at 10:24

## 2021-01-11 RX ADMIN — FENTANYL CITRATE 25 MCG: 50 INJECTION, SOLUTION INTRAMUSCULAR; INTRAVENOUS at 03:19

## 2021-01-11 RX ADMIN — SODIUM CHLORIDE: 9 INJECTION, SOLUTION INTRAVENOUS at 02:35

## 2021-01-11 RX ADMIN — SODIUM CHLORIDE, PRESERVATIVE FREE 10 ML: 5 INJECTION INTRAVENOUS at 21:00

## 2021-01-11 RX ADMIN — PIPERACILLIN AND TAZOBACTAM 3.38 G: 3; .375 INJECTION, POWDER, LYOPHILIZED, FOR SOLUTION INTRAVENOUS at 10:25

## 2021-01-11 RX ADMIN — MAGNESIUM CITRATE 296 ML: 1.75 LIQUID ORAL at 17:50

## 2021-01-11 RX ADMIN — Medication 2 MCG/MIN: at 01:00

## 2021-01-11 RX ADMIN — SODIUM CHLORIDE, PRESERVATIVE FREE 10 ML: 5 INJECTION INTRAVENOUS at 10:24

## 2021-01-11 RX ADMIN — ONDANSETRON HYDROCHLORIDE 4 MG: 4 INJECTION, SOLUTION INTRAMUSCULAR; INTRAVENOUS at 02:32

## 2021-01-11 RX ADMIN — AZITHROMYCIN DIHYDRATE 500 MG: 500 INJECTION, POWDER, LYOPHILIZED, FOR SOLUTION INTRAVENOUS at 04:33

## 2021-01-11 RX ADMIN — PIPERACILLIN AND TAZOBACTAM 3.38 G: 3; .375 INJECTION, POWDER, LYOPHILIZED, FOR SOLUTION INTRAVENOUS at 17:40

## 2021-01-11 RX ADMIN — PROPOFOL 50 MG: 10 INJECTION, EMULSION INTRAVENOUS at 02:20

## 2021-01-11 RX ADMIN — SODIUM CHLORIDE: 9 INJECTION, SOLUTION INTRAVENOUS at 18:13

## 2021-01-11 ASSESSMENT — PULMONARY FUNCTION TESTS
PIF_VALUE: 2
PIF_VALUE: 0
PIF_VALUE: 1
PIF_VALUE: 0
PIF_VALUE: 23
PIF_VALUE: 0
PIF_VALUE: 5
PIF_VALUE: 0

## 2021-01-11 ASSESSMENT — PAIN SCALES - GENERAL: PAINLEVEL_OUTOF10: 0

## 2021-01-11 NOTE — FLOWSHEET NOTE
0000  B/p=78/48 (58)-94, bp rechecked-75/50 (58)-94; HOB lowered and IV N/S rate increased to 999ml/hr  0005  81/54 (61)-94 HOB flat; patient roused when name called, but easily back to sleep; HOB flat and knees gatched completely. Daughter at bedside.   0010  84/52 [phone]  0015  87/52 [phone])-[phone]

## 2021-01-11 NOTE — PROGRESS NOTES
Dr. Rachele Reza, Dr. Rossana Khan and Tee Dozier NP at bedside to assess patient. Order received to transfer patient to ICU.

## 2021-01-11 NOTE — ANESTHESIA PRE PROCEDURE
Department of Anesthesiology  Preprocedure Note       Name:  Nicol Stanford   Age:  77 y.o.  :  1945                                          MRN:  [id number]         Date:  2021      Surgeon: Silvana Foster):  Yulia Mcgee MD    Procedure: Procedure(s):  CYSTOSCOPY URETERAL STENT INSERTION    Medications prior to admission:   Prior to Admission medications    Medication Sig Start Date End Date Taking? Authorizing Provider   Armodafinil 250 MG TABS Take 250 mg by mouth daily. 12/15/20  Yes Historical Provider, MD   LEVOXYL 125 MCG tablet Take 125 mcg by mouth daily 12/15/20  Yes Historical Provider, MD   modafinil (PROVIGIL) 200 MG tablet Take 200 mg by mouth daily.    Yes Historical Provider, MD   Multiple Vitamin (MULTI-VITAMINS PO) Take by mouth   Yes Historical Provider, MD   IRON PO Take by mouth   Yes Historical Provider, MD       Current medications:    Current Facility-Administered Medications   Medication Dose Route Frequency Provider Last Rate Last Admin    azithromycin (ZITHROMAX) 500 mg in D5W 250ml addavial  500 mg Intravenous Q24H Mala Ottx, DO        piperacillin-tazobactam (ZOSYN) 3.375 g in dextrose 5 % 50 mL IVPB extended infusion (mini-bag)  3.375 g Intravenous Q8H Mala Ottx, DO        meperidine (DEMEROL) injection 12.5 mg  12.5 mg Intravenous Q5 Min PRN Andi Adams MD        fentaNYL (SUBLIMAZE) injection 25 mcg  25 mcg Intravenous Q5 Min PRN Andi Adams MD        fentaNYL (SUBLIMAZE) injection 50 mcg  50 mcg Intravenous Q5 Min PRN Andi Adams MD        HYDROmorphone (DILAUDID) injection 0.25 mg  0.25 mg Intravenous Q5 Min PRN Andi Adams MD        HYDROmorphone (DILAUDID) injection 0.5 mg  0.5 mg Intravenous Q5 Min PRN Andi Adams MD        promethazine [facility]) injection 12.5 mg  12.5 mg Intramuscular Once PRN Andi Adams MD        metoclopramide (REGLAN) injection 10 mg  10 mg Intravenous Once PRN Andi Adams MD  diphenhydrAMINE (BENADRYL) injection 12.5 mg  12.5 mg Intravenous Once PRN Fani Raymundo MD        labetalol (NORMODYNE;TRANDATE) injection syringe 5 mg  5 mg Intravenous Q10 Min PRN Fani Raymundo MD        norepinephrine (LEVOPHED) 16 mg in dextrose 5% 250 mL infusion  2 mcg/min Intravenous Continuous Felipe IRMA Gayle - CNP 1.9 mL/hr at 01/11/21 0100 2 mcg/min at 01/11/21 0100    vancomycin (VANCOCIN) intermittent dosing (placeholder)   Other RX Placeholder Mala Bux, DO        vancomycin (VANCOCIN) 1,250 mg in dextrose 5 % 250 mL IVPB  1,250 mg Intravenous Q24H Mala Bux, DO        0.9 % sodium chloride infusion   Intravenous Continuous Mala Bux,  mL/hr at 01/10/21 2343 New Bag at 01/10/21 2343    sodium chloride flush 0.9 % injection 10 mL  10 mL Intravenous 2 times per day Mala Bux, DO        sodium chloride flush 0.9 % injection 10 mL  10 mL Intravenous PRN Mala Bux, DO        acetaminophen (TYLENOL) tablet 650 mg  650 mg Oral Q6H PRN Mala Bux, DO        Or    acetaminophen (TYLENOL) suppository 650 mg  650 mg Rectal Q6H PRN Mala Bux, DO           Allergies:  No Known Allergies    Problem List:    Patient Active Problem List   Diagnosis Code    Ureteral stone N20.1    Sepsis ([facility] [address] A41.9       Past Medical History:        Diagnosis Date    Anemia     Hypotension     Kidney stone     Thyroid disease        Past Surgical History:        Procedure Laterality Date    KIDNEY STONE SURGERY  5-2015    cystoscopy with right ureteroscopy, laser lithotripsy, basket retrieval of stone fragments and placement of right ureteral stent       Social History:    Social History     Tobacco Use    Smoking status: Never Smoker    Smokeless tobacco: Never Used   Substance Use Topics    Alcohol use:  Yes                                Counseling given: Not Answered      Vital Signs (Current):   Vitals:    01/11/21 0010 01/11/21 0018 01/11/21 0033 01/11/21 0100 BP: (!) 84/52 (!) 81/50 (!) 79/53 (!) 85/56   Pulse:    77   Resp:    25   Temp:    37.7 °C (99.8 °F)   TempSrc:    Rectal   SpO2:    92%   Weight:       Height:                                                  BP Readings from Last 3 Encounters:   01/11/21 (!) 85/56   05/19/15 106/62   05/14/15 100/60       NPO Status:                                                                                 BMI:   Wt Readings from Last 3 Encounters:   01/10/21 170 lb (77.1 kg)   05/28/15 150 lb (68 kg)   05/19/15 161 lb (73 kg)     Body mass index is 31.09 kg/m². CBC:   Lab Results   Component Value Date    WBC 1.3 01/10/2021    RBC 4.46 01/10/2021    HGB 12.9 01/10/2021    HCT 40.9 01/10/2021    MCV 91.7 01/10/2021    RDW 15.4 05/19/2015     01/10/2021       CMP:   Lab Results   Component Value Date     01/10/2021    K 4.2 01/10/2021    CL 98 01/10/2021    CO2 24 01/10/2021    BUN 30 01/10/2021    CREATININE 1.6 01/10/2021    LABGLOM 31 01/10/2021    GLUCOSE 113 01/10/2021    PROT 7.0 01/10/2021    CALCIUM 10.1 01/10/2021    BILITOT 0.7 01/10/2021    ALKPHOS 172 01/10/2021     01/10/2021    ALT 97 01/10/2021       POC Tests: No results for input(s): POCGLU, POCNA, POCK, POCCL, POCBUN, POCHEMO, POCHCT in the last 72 hours.     Coags: No results found for: PROTIME, INR, APTT    HCG (If Applicable): No results found for: PREGTESTUR, PREGSERUM, HCG, HCGQUANT     ABGs: No results found for: PHART, PO2ART, ZWN3JFU, YSZ9PKJ, BEART, Q6JCQSDL     Type & Screen (If Applicable):  No results found for: LABABO, LABRH    Drug/Infectious Status (If Applicable):  No results found for: HIV, HEPCAB    COVID-19 Screening (If Applicable):   Lab Results   Component Value Date    COVID19 NOT DETECTED 01/10/2021         Anesthesia Evaluation  Patient summary reviewed and Nursing notes reviewed no history of anesthetic complications:   Airway: Mallampati: III  TM distance: >3 FB   Neck ROM: full Mouth opening: < 3 FB Dental: normal exam         Pulmonary:Negative Pulmonary ROS                              Cardiovascular:Negative CV ROS                      Neuro/Psych:   Negative Neuro/Psych ROS              GI/Hepatic/Renal:   (+) renal disease: kidney stones,           Endo/Other:    (+) hypothyroidism, blood dyscrasia: anemia:., .                 Abdominal:           Vascular: negative vascular ROS. Anesthesia Plan      MAC     ASA 2 - emergent             Anesthetic plan and risks discussed with patient and child/children. Plan discussed with attending.                   IRMA Barton - CRNA   1/11/2021

## 2021-01-11 NOTE — ANESTHESIA POSTPROCEDURE EVALUATION
Department of Anesthesiology  Postprocedure Note    Patient: Be Yoon  MRN: [id number]  YOB: 1945  Date of evaluation: 1/11/2021  Time:  2:51 AM     Procedure Summary     Date: 01/11/21 Room / Location: [facility]    Anesthesia Start: 0211 Anesthesia Stop: 7412    Procedure: CYSTOSCOPY URETERAL LEFT STENT INSERTION (Left ) Diagnosis: (Sepsis)    Surgeons: Dalia Perez MD Responsible Provider: Colton Nevarez MD    Anesthesia Type: MAC ASA Status: 2 - Emergent          Anesthesia Type: MAC    Carli Phase I:      Carli Phase II:      Last vitals: Reviewed and per EMR flowsheets.        Anesthesia Post Evaluation    Patient location during evaluation: ICU  Patient participation: complete - patient participated  Level of consciousness: awake and alert  Airway patency: patent  Nausea & Vomiting: no nausea and no vomiting  Complications: no  Cardiovascular status: hemodynamically stable  Respiratory status: acceptable, spontaneous ventilation and nasal cannula  Hydration status: stable

## 2021-01-11 NOTE — PROGRESS NOTES
Pharmacy Note  Vancomycin Consult    Pam Hernández is a 77 y.o. female started on Vancomycin for sepsis due to UTI; consult received from Dr. Kamlesh Coates to manage therapy. Also receiving the following antibiotics: Zosyn, Zithromax. Patient Active Problem List   Diagnosis    Ureteral stone    Sepsis ([address]       Allergies:  Patient has no known allergies. Temp max: 102.8    Recent Labs     01/10/21  1735   BUN 30*       Recent Labs     01/10/21  1735   CREATININE 1.6*       Recent Labs     01/10/21  1735   WBC 1.3*         Intake/Output Summary (Last 24 hours) at 1/11/2021 0102  Last data filed at 1/10/2021 2154  Gross per 24 hour   Intake 69536.4 ml   Output    Net 76353.4 ml       Culture Date Source Results   1/10/21 Urine    1/10/21 Blood x 2          Ht Readings from Last 1 Encounters:   01/10/21 5' 2\" (1.575 m)        Wt Readings from Last 1 Encounters:   01/10/21 170 lb (77.1 kg)         Body mass index is 31.09 kg/m². Estimated Creatinine Clearance: 30 mL/min (A) (based on SCr of 1.6 mg/dL (H)). Goal Trough Level: 15-20 mcg/mL    Assessment/Plan:  Will initiate Vancomycin with a one time loading dose of 1000 mg x1 (by ED at 1927), followed by 1250 mg IV every 24 hours (SCr 1.6 - no mention in notes of CKD, kidney stone only). Timing of trough level will be determined based on culture results, renal function, and clinical response. Thank you for the consult. Will continue to follow.       Nidia Arauz RPhailee, BCPS, BCGP  1/11/2021     1:06 AM

## 2021-01-11 NOTE — PROGRESS NOTES
Urology Progress Note    Chief Complaint: weakness, fever  Reason for consult: left sided obstructing stone, fever and tachycardia    Subjective: \"still have left sided pain like before. \"    Patient is resting in bed, pfeiffer draining dark bloody urine, - flatus, -BM, ambulating with assistance, tolerating regular diet, denies any nausea or vomiting. There are complaints of left sided flank pain at this time. Afebrile since stent placement, on levophed due to hypotension, attempting to wean off. Hx of stones in the past, she reports she doesn't tolerate stents well. Vitals:  /65   Pulse 79   Temp 98.1 °F (36.7 °C) (Oral)   Resp 28   Ht 5' 2\" (1.575 m)   Wt 169 lb 15.6 oz (77.1 kg)   SpO2 94%   BMI 31.09 kg/m²   Temp  Av.7 °F (38.2 °C)  Min: 98.1 °F (36.7 °C)  Max: 102.8 °F (39.3 °C)    Intake/Output Summary (Last 24 hours) at 2021 1150  Last data filed at 2021 0235  Gross per 24 hour   Intake 26274.4 ml   Output    Net 54511.4 ml       Social History     Socioeconomic History    Marital status:      Spouse name: Not on file    Number of children: Not on file    Years of education: Not on file    Highest education level: Not on file   Occupational History    Not on file   Social Needs    Financial resource strain: Not on file    Food insecurity     Worry: Not on file     Inability: Not on file    Transportation needs     Medical: Not on file     Non-medical: Not on file   Tobacco Use    Smoking status: Never Smoker    Smokeless tobacco: Never Used   Substance and Sexual Activity    Alcohol use:  Yes    Drug use: No    Sexual activity: Not on file   Lifestyle    Physical activity     Days per week: Not on file     Minutes per session: Not on file    Stress: Not on file   Relationships    Social connections     Talks on phone: Not on file     Gets together: Not on file     Attends Quaker service: Not on file Active member of club or organization: Not on file     Attends meetings of clubs or organizations: Not on file     Relationship status: Not on file    Intimate partner violence     Fear of current or ex partner: Not on file     Emotionally abused: Not on file     Physically abused: Not on file     Forced sexual activity: Not on file   Other Topics Concern    Not on file   Social History Narrative    Not on file     History reviewed. No pertinent family history. No Known Allergies      Constitutional: Alert and oriented times x3, no acute distress, and cooperative to examination with appropriate mood and affect. HEENT:   Head:         Normocephalic and atraumatic. Mucous membranes are normal.   Eyes:         EOM are normal. No scleral icterus. Nose:    The external appearance of the nose is normal  Ears: The ears appear normal to external inspection. Cardiovascular:       Normal rate, regular rhythm. Pulmonary/Chest:  Normal respiratory rate and rhthym. No use of accessory muscles. Lungs clear bilaterally. Abdominal:          Soft. No tenderness. Active bowel sounds. Genitalia:    Carrillo catheter draining bloody urine, minimal amount. Musculoskeletal:    Normal range of motion. Extremities:    No cyanosis, clubbing, or edema present. Neurological:    Alert and oriented.      Labs:  WBC:    Lab Results   Component Value Date    WBC 23.4 01/11/2021     Hemoglobin/Hematocrit:    Lab Results   Component Value Date    HGB 10.6 01/11/2021    HCT 33.6 01/11/2021     BMP:    Lab Results   Component Value Date     01/11/2021    K 4.0 01/11/2021     01/11/2021    CO2 18 01/11/2021    BUN 37 01/11/2021    LABALBU 3.0 01/11/2021    CREATININE 2.1 01/11/2021    CALCIUM 7.8 01/11/2021    LABGLOM 23 01/11/2021       Impression:    Septic shock  Left sided obstructing kidney stone- 6 mm at the left UVJ  Pneumonia  CHRISTY  Hx of kidney stones  UTI  Cholelithiasis  Constipation       Plan: S/P emergent cystoscopy, left sided stent placement by Dr. Jose Antonio Antoine due to obstructing stone early this morning. Pt still with left flank pain, it improves when lying on left side. Oxybutynin and B and O suppository ordered as needed for stent pain. Urine with minimal output, bloody in color. On Vanc and Zosyn, afebrile. Urine culture post stent is pending. Reports not having bowel movement for several days prior to arrival. Started on colace    Will need definitive treatment of stone outpatient. Currently on Levophed. Will continue to follow along.     Crow Goddard, APRN  01/11/21 11:50 AM  Urology

## 2021-01-11 NOTE — PROGRESS NOTES
Pt admitted to 3B 37   INT Left hand. Vital signs obtained. Assessment and data collection initiated. Two nurse skin assessment performed by Leilani Malney RN and Kavon Fleming RN. Oriented to room. Policies and procedures for  explained. Peg CASTRO discussed hourly rounding with patient addressing 5 P's. Fall prevention and safety brochure discussed with patient. Bed alarm on. Call light in reach. Explained patients right to have family, representative or physician notified of their admission. Patient has Declined for physician to be notified. Patient has Declined for family/representative to be notified as daughter at bedside. All questions answered with no further questions at this time.

## 2021-01-11 NOTE — FLOWSHEET NOTE
01/11/21 1025   Encounter Summary   Services provided to: Patient   Referral/Consult From: Rounding   Continue Visiting Yes  (1/11 NR)   Complexity of Encounter Low   Length of Encounter 15 minutes   Routine   Type Initial   Assessment Sleeping   Intervention Prayer   In my encounter with the 77  yr old patient, I attempted to see the patient but they were unresponsive and on the ventilator at this time. The pt was admitted due to sepsis. No family was present in the room. A  will attempt to see the patient at a later time as a follow up.

## 2021-01-11 NOTE — PROCEDURES
Central Venous Catheterization Procedure Note    Indication:  [x] Lack of adequate peripheral IV access    [x] Infusion of medications with high risk of extravasation injury  [] Hemodynamic monitoring  [] Extracorporeal therapies  [] Other                     Time Out:  [x] Time out was completed immediately prior to the start of the procedure which included verification of the correct patient, correct site and agreement on the procedure to be done. [] Time out was not done because procedure was emergent    Consent:  [x] The patient/surrogate decision maker was informed of the procedure indications, risks, benefits and alternatives. Questions were answered and consent was obtained to proceed with the procedure. [] Consent was not obtained, because the procedure was emergent or patient was unable to consent and surrogate decision maker was not available. Type of Central Line Being Placed:   [x] Central venous    [] Hemodialysis  [] Pulmonary artery    Location:   [x] Internal Jugular Vein [x] Right [] Left  [] Subclavian Vein  [] Right [] Left  [] Femoral Vein   [] Right [] Left    Central Line Bundle:  [x] I washed/disinfected my hands prior to starting procedure  [x] Hat      [x] Mask      [x] Sterile gown      [x] Sterile gloves were worn throughout the procedure  [x] Everyone in the room during the procedure wore a mask  [x] Chlorhexidine was utilized to prep the skin and allowed to dry completely  [x] Full body drape was used to cover the patient    Ultrasound Guidance:   [x] Yes      [] No    Anesthetic Used:  [x] Lidocaine      [] Other    Sterile Technique Used Throughout Procedure? [x] Yes      [] No    Description/Findings:   [x] Dark non-pulsatile blood return obtained from all ports  [] Appropriate wavefom(s) seen  [] Other    Complications:  [x] None apparent    Follow-up CXR:  Reviewed by me. Line placement appropriate. No pneumothorax.       Procedure Performed By: Dr. Mary Devlin Electronically signed by Elza Pierre MD on 1/11/2021 at 4:45 AM

## 2021-01-11 NOTE — CONSULTS
CRITICAL CARE PROGRESS NOTE      Patient:  Nicol Stanford    Unit/Bed:4D-14/014-A  YOB: 1945  MRN: [id number]   PCP: Bernard Harrington MD  Date of Admission: 1/10/2021  Chief Complaint:-Fever and emesis    Assessment and Plan:    1. Septic shock: Secondary to urinary tract infection/nephrolithiasis/acute cholecystitis/possible pneumonia. Lactic acid 2.1, procalcitonin 50. 18. Blood cultures pending, patient received 3 L fluid bolus however still required pressors after. Patient started on vancomycin, Zosyn and azithromycin  2. Acute hypoxic respiratory failure: Secondary to sepsis +/-  pneumonia. Pneumonia panel pending. Patient started on Zosyn, azithromycin, vancomycin  3. Left obstructing kidney stone: 6 mm calculus at left ureterovescucal junction. Patient underwent cystoscopy with stent placement per urology, Dr. Sunny Hoang early morning 1/11/21. Patient is having very little urine output at this time, urine output that she does have is grossly bloody. Will continue to monitor  4. Pneumonia: Chest x-ray demonstrated infiltrates of right lower lung bases. Pneumonia molecular panel pending. Continue empiric coverage with Zosyn, azithromycin and vancomycin. 5. Extensive Cholelithiasis: Demonstrated by CT on 1/10/2021 however patient does not complain of right upper quadrant tenderness and patient had negative Segura sign. Patient's labs do demonstrate transaminitis. Will order right upper quadrant ultrasound to further evaluate gallbladder. We will also order acute hepatic panel. We will continue to monitor and trend LFTs. 6. UTI: UA reports moderate leukocyte Estrace, many bacteria and 50-75 WBC. Urine cultures pending. Continue empiric coverage with Zosyn and vancomycin   7. History of hypothyroidism: Patient's daughter had a list of medications and doses. Will start levothyroxine 125 mcg daily.        INITIAL H AND P AND ICU COURSE: 69-year-old female presented to [facility] with confusion, fever as well as generalized weakness. Patient stated symptoms began the night of 1/8/21 and progressively worsened to the morning of 1/10/2021. Patient called her daughter 1/10/2021 and stated that she not be going to [facility] due to not feeling well. Family members went to check on patient around 4 PM and she is found be febrile, somnolent and confused. Of note her daughter did state patient complained of a vague headache 3 days ago and feeling left-sided pain as well as vomiting multiple times. Patient denies redness of breath, chest pain, abdominal pain. Patient has a significant past medical history of anemia, hypotension, hypothyroidism, kidney stones in which she required surgery of right ureter and laser lithotripsy 5/2015. While in emergency department was found patient had a left-sided distal obstructing kidney stone, 6 mm. Patient was to be taken to the OR however there was a delay in the operating room secondary emergencies. Patient received 3 L fluid bolus while in the ED. Patient was transferred to the ICU for further management. 1/11/21: After several liters of fluid resuscitation patient still required Levophed currently at 9 mcg. Patient also demonstrating very little urine output that is grossly bloody. Patient difficulty time swallowing sips of water, ordered swallow evaluation. Of note patient is also on 2 central nervous system stimulants (armodafinil 250 mg, Modafinil 200 mg ) I am unsure why patient is on these 2 medications as for there is no documentation supporting this. Past Medical History: Per HPI  Family History: No family history known  Social History:  , social alcohol use, never smoked    ROS   Patient denies chest pain, shortness of breath, palpitations, coughing, abdominal pain, flank pain, headache or dizziness    Scheduled Meds:   azithromycin  500 mg Intravenous Q24H ? 1/10/21 abdominal pelvis CT without contrast reports 1. Obstructive 6 mm calculus at the left ureterovesicular junction. Mild to moderate left hydronephrosis and moderate left perinephritic stranding. 2.  Extensive cholelithiasis. Gallbladder wall thickening and surrounding fluid may reflect acute cholecystitis. 3.  Left hemicolon diverticulosis without diverticulitis   ? 1/10/21 CXR reports mild cardiomegaly without effusion. Mild atelectasis/pneumonia right lung base inferior medial  ? 1/10/21 EKG reports: Sinus tachycardia Nonspecific T wave abnormality Abnormal ECG When compared with ECG of 13-MAY-2015 14:48, No significant change was found          Seen with multidisciplinary ICU team.  Meets Continued ICU Level Care Criteria:    [x] Yes   [] No - Transfer Planned to listed location:  [] HOSPITALIST CONTACTED-      Case and plan discussed with Dr. Raul Jimenez. Electronically signed by Deana Ackerman DO  CRITICAL CARE SPECIALIST  Patient seen by me. Case discussed with resident physician. Continue with antibiotics. .  Time was discontiguous. Time does not include procedures. Time does include my direct assessment of the patient and coordination of care.   Electronically signed by Edgardo Oliva MD on 1/11/2021 at 5:55 PM

## 2021-01-11 NOTE — CARE COORDINATION
DISASTER CHARTING    1/11/21, 9:12 AM EST    DISCHARGE ONGOING EVALUATION:     Edmund Thomas day: 1  Location: [facility]14/014-A Reason for admit: Sepsis [facility]) [A41.9]     1/11 Cystoscopy and left-sided ureteral stent placed    Barriers to Discharge: Presented with c/o generalized weakness, confusion, n/v, and fever. On day of presentation, family found her febrile, somnolent, and confused. Admitted to , transferred to ICU yesterday. Received fluid resuscitation and started on levophed. Urology consulted for UTI and left sided kidney stone. Taken to OR for cystoscopy and left ureteral stent placement overnight. Tmax 102.8. NSR. Sats 98% on 1L O2. Ox4. SCDs. IVF, levo @ 7 mcg/min, IV zithromax, synthroid, IV zosyn, IV vancomycin. BUN 30 - now 37, Creat 1.6 -now 2.1, LA 2.1 -now 1.6, procal 50.18, pro-bnp 4074, trop neg, alb 4.2 - now 3, alk phos 172 - now 160, alt 97 - now 203, ast 116 - now 221, wbc 1.3- now 23.4, hgb 12.9 - now 10.6. Urine w/mod leukocytes - sent for culture. Blood cultures sent. PCP: Haritha Bangura MD  Patient Goals/Plan/Treatment Preferences: Spoke with Isac Eid; states she lives at home alone and did not use any DME PTA. 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 Isac Eid states she plans to return home at discharge, denies needs, and declines HH.

## 2021-01-11 NOTE — CONSULTS
Janes Shetty, [address] [facility] [address], [place]. Urology Consultation    Patient:  Leigh Gamble  MRN: [id number]  YOB: 1945    CHIEF COMPLAINT:  Left obstructing kidney stone, septic    HISTORY OF PRESENT ILLNESS:   The patient is a 77 y.o. female who presents with left side distal obstructing kidney stone, who presents with fever and tachycardia. BP is liable. She was transferred to ICU when OR was delayed due to ongoing emergency. No pressor support at this time. The patient is found to have luekopenia, WBC: 1.3. Suspected sepsis secondary to UTI. She is started on antibiotics. Urine cultures are negative. Patient's old records, notes and chart reviewed and summarized above. Past Medical History:    Past Medical History:   Diagnosis Date    Anemia     Hypotension     Kidney stone     Thyroid disease        Past Surgical History:    Past Surgical History:   Procedure Laterality Date    KIDNEY STONE SURGERY  5-2015    cystoscopy with right ureteroscopy, laser lithotripsy, basket retrieval of stone fragments and placement of right ureteral stent     Previous  surgery: none   Medications:    Scheduled Meds:   azithromycin  500 mg Intravenous Q24H    piperacillin-tazobactam  3.375 g Intravenous Q8H    vancomycin (VANCOCIN) intermittent dosing (placeholder)   Other RX Placeholder    vancomycin  1,250 mg Intravenous Q24H    sodium chloride flush  10 mL Intravenous 2 times per day     Continuous Infusions:   norepinephrine 2 mcg/min (01/11/21 0100)    sodium chloride 125 mL/hr at 01/10/21 2343     PRN Meds:.meperidine, fentanNYL, fentanNYL, HYDROmorphone, HYDROmorphone, promethazine, metoclopramide, diphenhydrAMINE, labetalol, sodium chloride flush, acetaminophen **OR** acetaminophen    Allergies:  Patient has no known allergies. Social History:    Social History     Socioeconomic History    Marital status:       Spouse name: Not on file  Number of children: Not on file    Years of education: Not on file    Highest education level: Not on file   Occupational History    Not on file   Social Needs    Financial resource strain: Not on file    Food insecurity     Worry: Not on file     Inability: Not on file    Transportation needs     Medical: Not on file     Non-medical: Not on file   Tobacco Use    Smoking status: Never Smoker    Smokeless tobacco: Never Used   Substance and Sexual Activity    Alcohol use: Yes    Drug use: No    Sexual activity: Not on file   Lifestyle    Physical activity     Days per week: Not on file     Minutes per session: Not on file    Stress: Not on file   Relationships    Social connections     Talks on phone: Not on file     Gets together: Not on file     Attends Yarsanism service: Not on file     Active member of club or organization: Not on file     Attends meetings of clubs or organizations: Not on file     Relationship status: Not on file    Intimate partner violence     Fear of current or ex partner: Not on file     Emotionally abused: Not on file     Physically abused: Not on file     Forced sexual activity: Not on file   Other Topics Concern    Not on file   Social History Narrative    Not on file     Family History:  History reviewed. No pertinent family history.   Previous Urologic Family history: none  REVIEW OF SYSTEMS:  Constitutional: negative  Eyes: negative  Respiratory: negative  Cardiovascular: negative  Gastrointestinal: negative  Genitourinary: see HPI  Musculoskeletal: negative  Skin: negative   Neurological: negative  Hematological/Lymphatic: negative  Psychological: negative    Physical Exam:      This a 77 y.o. female   Patient Vitals for the past 24 hrs:   BP Temp Temp src Pulse Resp SpO2 Height Weight   01/11/21 0100 (!) 85/56 99.8 °F (37.7 °C) Rectal 77 25 92 %     01/11/21 0033 (!) 79/53          01/11/21 0018 (!) 81/50        01/11/21 0010 (!) 84/52          01/11/21 0005 (!) 81/54          01/11/21 0001 (!) 75/50          01/11/21 0000 (!) 78/48          01/10/21 2345 (!) 84/54          01/10/21 2330 (!) 84/53          01/10/21 2315 (!) 89/54          01/10/21 2310 (!) 78/48          01/10/21 2301 (!) 83/53 98.7 °F (37.1 °C) Oral 95  96 % 5' 2\" (1.575 m) 170 lb (77.1 kg)   01/10/21 2154 (!) 91/56   102 20 96 %     01/10/21 2134 90/64   95 16 97 %     01/10/21 2119 93/66   99 18 95 %     01/10/21 2057 86/61   101 27 95 %     01/10/21 2014 89/60 102.6 °F (39.2 °C) Rectal 100 20 96 %     01/10/21 1927 (!) 89/54   98 20 93 %     01/10/21 1843  102.8 °F (39.3 °C)  105 26 96 %  155 lb 1.6 oz (70.4 kg)   01/10/21 1715 134/67 102.1 °F (38.9 °C) Oral 110 24 90 %       Constitutional: Patient in no acute distress. Neuro: Alert and oriented to person, place and time. Psych: mood and affect normal  HEENT negative  Lungs: Respiratory effort is normal  Cardiovascular: Normal peripheral pulses  Abdomen: Soft, non-tender, non-distended with no CVA, flank pain or hepatosplenomegaly. No hernias. Kidneys normal.  Lymphatics: No palpable lymphadenopathy. Bladder non-tender and not distended.   Pelvic exam: deferred  Rectal exam not indicated    LABS:   Recent Labs     01/10/21  1735   WBC 1.3*   HGB 12.9   HCT 40.9   MCV 91.7        Recent Labs     01/10/21  1735      K 4.2   CL 98   CO2 24   BUN 30*   CREATININE 1.6*       Additional Lab/culture results:    Urinalysis:   Recent Labs     01/10/21  1900   COLORU YELLOW   PHUR 5.0   LABCAST NONE SEEN  NONE SEEN   WBCUA 50-75   RBCUA 0-2   YEAST NONE SEEN   BACTERIA MANY   SPECGRAV 1.014   LEUKOCYTESUR MODERATE*   UROBILINOGEN 0.2   BILIRUBINUR NEGATIVE   BLOODU MODERATE*        -----------------------------------------------------------------  Imaging Results:      Assessment and Plan   Impression: Patient Active Problem List   Diagnosis    Ureteral stone    Sepsis ([facility] [address]       Plan:    To OR for urgent cysto left stent    Felipe Guerra  2:07 AM 1/11/2021

## 2021-01-11 NOTE — H&P
Hospitalist - History & Physical      Patient: Vinny Hinkle    Unit/Bed:09/009A  YOB: 1945  MRN: [id number]   Acct: [de-identified]   PCP: Avila Carbone MD    Date of Service: Pt seen/examined on 01/10/21  and Admitted to Inpatient with expected LOS greater than two midnights due to medical therapy. Chief Complaint:  Fever, emesis    Assessment and Plan:-  1. Septic Shock  -likely due to UTI, ?CAP, acute cholecystitis and nephrolithiasis (?septic stone)  -lactic acid 2.1; procal 50.18  -blood cultures and urine cultures pending  -received over 3L of fluids-unresponsive; need pressor support  -van and zosyn + azithromycin for coverage  -lactic acid repeat every 4 hours  -random cortisol level  -transfer to ICU    Complicated UTI   -moderate leuk esterase, 50-75 WBC, and many bacteria  -urine culture pending   -empiric coverage with zosyn and vanc (given anticipated instrumentation with stenting)    Acute Cholecystitis  -no associated RUQ tenderness or Segura's sign  -elevated Transaminitis  -Ct Abdomen findings supportive  -consider RUQ US  -zosyn for coverage  -trend liver function panel    ?  CAP  -chest xray positive for infiltrates on R lung bases  -molecular panel  -coverage with zosyn, azithromycin  -pulmonary hygiene    L Obstructive Nephrolithiasis   -6 mm calculus at the left ureterovesical junction  -IVF  -pain control  -cystoscopy and stent placement per urology  -as above    Acute Hypoxic Respiratory Failure  -requiring 2-3L NC  -due to sepsis and CAP  -keep SpO2>90%    Altered Mental Status  - infectious, due to UTI and Sepsis  -Ct head negative for acute findings  -monitor for improvement       History Of Present Illness: Dieudonne Lucero is a 77year old female who presents to [facility] ED for generalized weakenss, confusion , and fever. Patient's daughter in law states patient has not been feeling well for a few days and has been having N/V. Patient today was checked on by her family memebrs patient was febrile, somnolent, and confused. Chronically in the low  in her systolic BP. Temperature of 102.1, respiratory rate of 24, pulse of 110, blood pressure of 134/67, SPO2 of 90    Chest x-ray reveals pneumonia in the right lower base  CT of the abdomen shows supportive findings for acute cholecystitis and obstructive 6 mm calculus at the left ureterovesical junction, extensive cholelithiasis  CT of the head shows no acute findings    Past Medical History:        Diagnosis Date    Anemia     Hypotension     Kidney stone     Thyroid disease        Past Surgical History:        Procedure Laterality Date    KIDNEY STONE SURGERY  5-2015    cystoscopy with right ureteroscopy, laser lithotripsy, basket retrieval of stone fragments and placement of right ureteral stent       Home Medications:   No current facility-administered medications on file prior to encounter. Current Outpatient Medications on File Prior to Encounter   Medication Sig Dispense Refill    Armodafinil 250 MG TABS Take 250 mg by mouth daily.  LEVOXYL 125 MCG tablet Take 125 mcg by mouth daily      modafinil (PROVIGIL) 200 MG tablet Take 200 mg by mouth daily.  Multiple Vitamin (MULTI-VITAMINS PO) Take by mouth      IRON PO Take by mouth         Allergies:    Patient has no known allergies. Social History:    reports that she has never smoked. She has never used smokeless tobacco. She reports current alcohol use. She reports that she does not use drugs. Family History:   History reviewed. No pertinent family history.     Diet:  No diet orders on file Review of systems:   Pertinent positives as noted in the HPI. All other systems reviewed and negative. PHYSICAL EXAM:  BP 90/64   Pulse 95   Temp 102.6 °F (39.2 °C) (Rectal)   Resp 16   Wt 155 lb 1.6 oz (70.4 kg)   SpO2 97%   BMI 27.47 kg/m²   General appearance: acutely ill appearing white female; HEENT: Normal cephalic, atraumatic without obvious deformity. Pupils equal, round, and reactive to light. Extra ocular muscles intact. Conjunctivae/corneas clear. Neck: Supple, with full range of motion. No jugular venous distention. Trachea midline. Respiratory:  Normal respiratory effort. Clear to auscultation, bilaterally without Rales/Wheezes/Rhonchi. Cardiovascular: Regular rate and rhythm with normal S1/S2 without murmurs, rubs or gallops. Abdomen: Soft, non-tender, non-distended with normal bowel sounds. Musculoskeletal:  No clubbing, cyanosis or edema bilaterally. Skin: Skin color, texture, turgor normal.  No rashes or lesions. Neurologic:  Neurovascularly intact without any focal sensory/motor deficits. Cranial nerves: II-XII intact, grossly non-focal.  Psychiatric: Alert and oriented, thought content appropriate, normal insight  Capillary Refill: Brisk,< 3 seconds   Peripheral Pulses: +2 palpable, equal bilaterally     Labs:   Recent Labs     01/10/21  1735   WBC 1.3*   HGB 12.9   HCT 40.9        Recent Labs     01/10/21  1735      K 4.2   CL 98   CO2 24   BUN 30*   CREATININE 1.6*   CALCIUM 10.1     Recent Labs     01/10/21  1735   *   ALT 97*   BILITOT 0.7   ALKPHOS 172*     No results for input(s): INR in the last 72 hours. No results for input(s): Lyndee Jose Roberto in the last 72 hours.     Urinalysis:    Lab Results   Component Value Date    NITRU NEGATIVE 01/10/2021    WBCUA 50-75 01/10/2021    BACTERIA MANY 01/10/2021    RBCUA 0-2 01/10/2021    BLOODU MODERATE 01/10/2021    SPECGRAV 1.014 01/10/2021       Radiology:   XR CHEST PORTABLE   Final Result 1. Mild cardiomegaly. No effusion. 2. Minimal atelectasis/pneumonia right lung base inferomedially. **This report has been created using voice recognition software. It may contain minor errors which are inherent in voice recognition technology. **      Final report electronically signed by Dr. Veronika Mendiola on 1/10/2021 5:55 PM      CT Head WO Contrast   Final Result   No acute intracranial process. **This report has been created using voice recognition software. It may contain minor errors which are inherent in voice recognition technology. **      Final report electronically signed by Dr. Veronika Mendiola on 1/10/2021 5:54 PM      CT ABDOMEN PELVIS WO CONTRAST Additional Contrast? None    (Results Pending)     Ct Head Wo Contrast    Result Date: 1/10/2021  PROCEDURE: NONCONTRAST CT BRAIN CLINICAL INFORMATION: confusion, fever COMPARISON: No prior study TECHNIQUE: Multiple axial 5 mm images of the brain were obtained without administration of intravenous contrast material. ALL CT SCANS AT THIS FACILITY use dose modulation, iterative reconstruction, and/or weight-based dosing when appropriate to reduce radiation dose to as low as reasonably achievable. FINDINGS: Lateral, third, fourth ventricles: Mild prominence of the ventricles diffusely, consistent with mild central atrophy. Mild diffuse cerebral cortical atrophy is also demonstrated. Parenchyma:  No acute infarction, mass lesion, or intracranial hemorrhage is seen. Mastoid processes: Well aerated. Normal in appearance. .   Paranasal sinuses/calvarium: Unremarkable     No acute intracranial process. **This report has been created using voice recognition software. It may contain minor errors which are inherent in voice recognition technology. ** Final report electronically signed by Dr. Veronika Mendiola on 1/10/2021 5:54 PM    Xr Chest Portable    Result Date: 1/10/2021 PROCEDURE: XR CHEST PORTABLE CLINICAL INFORMATION: fever COMPARISON: No prior study. TECHNIQUE: A single mobile view of the chest was obtained. 1. Mild cardiomegaly. No effusion. 2. Minimal atelectasis/pneumonia right lung base inferomedially. **This report has been created using voice recognition software. It may contain minor errors which are inherent in voice recognition technology. ** Final report electronically signed by Dr. Vidal Nicolas on 1/10/2021 5:55 PM        EKG: normal sinus rhythm, no blocks or conduction defects, no ischemic changes    Electronically signed by Shelbie Garcia DO on 1/10/2021 at 9:52 PM

## 2021-01-11 NOTE — FLOWSHEET NOTE
Patient arrived to unit from  via bed. Patient transferred to ICU bed and placed on continuous ICU bedside monitor. Patient admitted for Sepsis ([facility] [address] [A41.9]. Vitals obtained. See flowsheets. Patient's IV access includes 20g left hand. Current infusions and rates of infusion include 0.9 @125ml/hr and levo started @ 2 mcg/min. Assessment completed by Norbert CASTRO. Two nurse skin assessment completed by Ermias Albright and Norbert CASTRO. See flowsheets for assessment details. Policies and procedures of ICU able to be explained to patient at this time. Family member(s)/representative(s) present at time of admission include daughter. Patient rights explained to family member(s)/representatives and patient, as able. Patient/patient's family member(s)/representative(s) Requested to have physician notified of their admission. All questions posed by patient's family member(s)/representative(s) and patient answered at this time.

## 2021-01-11 NOTE — FLOWSHEET NOTE
4282 Timeout performed for patient having central line placed by Dr Joel Groves. Right patient, right procedure, right location, no known allergies. 4306 Central line placed in right IJ. Chest xray called and order placed.

## 2021-01-11 NOTE — OP NOTE
FACILITY: [facility] [address]  1945  [id number]    DATE:  01/11/21  SURGEON:  Dr. Emilia Flores M.D.  Wellington Baron:  Dr. Emilia Flores M.D. PREOPERATIVE DIAGNOSIS: left septic Kidney stone   POSTOPERATIVE DIAGNOSIS: Same  PROCEDURES PERFORMED:  1. Cystoscopy. 2. Left sided stent placement. DRAINS: A Left sided 6x26 cm double-J ureteral stent  SPECIMEN: post stent urine culture  ANESTHESIA: General  ESTIMATED BLOOD LOSS:  None.   COMPLICATIONS:  None.     INDICATIONS FOR PROCEDURE:  Leticia Yap is a 77 y.o. female presents for left kidney stone. After the risks, benefits, alternatives, of the procedure were discussed with the patient, informed consent was obtained. The patient elected to proceed.     DETAILS OF THE PROCEDURE:  The patient was brought back to the preoperative holding area to the operating suite, and was transferred to the operating table where the patient lay in supine position. General endotracheal anesthesia was induced, and patient was prepped and draped in standard surgical fashion after being placed in dorsolithotomy position. A proper timeout was performed, preoperative antibiotics were given. A rigid cystoscope with a 22 Serbian sheath with 30 degree lens was inserted in the patient's urethral and into the bladder with ease. We then focused our attention on the left ureteral orifice, which we cannulated with our glidewire. Over our glidewire we placed a 6x26 cm double-J ureteral stent and we noted appropriate placement in the upper collecting system using fluoroscopy. We then removed our wire. There was good proximal and distal curl. We did not leave the string at the end of the stent. We then drained the patient's bladder and removed the scope and the procedure was subsequently terminated.       Plan:   Observe  Continue antibiotics

## 2021-01-11 NOTE — PROGRESS NOTES
Patient transferred to [phone] for hypotension. Face to face bedside report given to Norbert CASTRO.

## 2021-01-11 NOTE — PROGRESS NOTES
Pharmacy Vancomycin Consult     Vancomycin Day: 2  Current Dosing: Vancomycin 1250 mg Q24H    Temp max:  102.6    Recent Labs     01/10/21  1735 01/11/21  0425   BUN 30* 37*       Recent Labs     01/10/21  1735 01/11/21  0425   CREATININE 1.6* 2.1*       Recent Labs     01/10/21  1735 01/11/21  0425   WBC 1.3* 23.4*         Intake/Output Summary (Last 24 hours) at 1/11/2021 1020  Last data filed at 1/11/2021 0235  Gross per 24 hour   Intake 69908.4 ml   Output    Net 41937.4 ml       Culture Date Source Results   1/10/21 Urine    NGTD    1/10/21 Blood x 2 NGTD       Ht Readings from Last 1 Encounters:   01/10/21 5' 2\" (1.575 m)        Wt Readings from Last 1 Encounters:   01/11/21 169 lb 15.6 oz (77.1 kg)         Body mass index is 31.09 kg/m². Estimated Creatinine Clearance: 23 mL/min (A) (based on SCr of 2.1 mg/dL (H)). Assessment/Plan:  Due to worsening of Scr and urine output not documented, will adjust vancomycin dose to 1250 mg Q36H. Patient received 1000 mg in ED on 1/10/2021 at 1927, so next dose will be scheduled for 1/12/2021 at 0700. Will plan to check trough prior to fourth dose of regimen. Will continue to monitor culture results, renal function, and clinical response.       Cori Taylor, PharmD   1/11/2021, 10:31 AM

## 2021-01-11 NOTE — PROGRESS NOTES
[address] ICU 4D  Bedside Swallowing Evaluation      SLP Individual Minutes  Time In: 1100  Time Out: 9322  Minutes: 8  Timed Code Treatment Minutes: 0 Minutes       Date: 2021  Patient Name: Donavon Blanco      CSN: [id number]   : 1945  (77 y.o.)  Gender: female   Referring Physician: Gordy Monsivais DO  Diagnosis:Sepsis  Secondary Diagnosis: Dysphagia    History of Present Illness/Injury: Pt admitted to [facility] with above med dx; please refer to physician H&P for full details. Pt with complicated medical course s/t admitting dx resulting in intensive medical treatment. Pertinent medical events include acute hypoxic respiratory failure and pneumonia. ST consulted to complete clinical swallow evaluation to ascertain need for dysphagia interventions. Past Medical History:   Diagnosis Date    Anemia     Hypotension     Kidney stone     Thyroid disease        SUBJECTIVE:  Pt lying in bed upon ST arrival. Pt was alert during evaluation. Family member present at bedside. Pt denies hx for dysphagia prior to admission. OBJECTIVE:    Pain:  No pain reported. Current Diet: NPO    Respiratory Status:  Nasal Canula    Behavioral Observation:  Alert and Oriented    Oral Mechanism Evaluation:      Facial / Labial WFL    Lingual WFL    Dentition WFL    Velum WFL    Vocal Quality WFL    Sensation Not Tested    Cough Not Tested      Patient Evaluated Using: Thin liquids via straw, puree, hard/coarse    Oral Phase:  WFL    Pharyngeal Phase: WFL:  Pharyngeal phase appears WFL but cannot rule out pharyngeal phase deficits from a bedside swallowing evaluation alone. Signs and Symptoms of Laryngeal Penetration/Aspiration: No signs/symptoms of aspiration evident in this evaluation, but cannot rule out silent aspiration. Impresssions: Pt presents with no symptoms of any oropharyngeal dysphagia. Pt had adequate lip seal, appropriate mastication and  cohesive bolus formation noted in efficiency with trials completed to reflect hard/coarse food items. Thin H20 via straw with what appears to be appropriate control/containment of fluid bolus. Pt demonstrated no overt s/s of aspiration across all PO trials. However, cannot r/o potential airway invasion and presence/absence of pharyngeal dysfunction without formal instrumentation. Pt's swallow physiology appears functional to support a regular diet without any distress. Recommend regular diet with thin liquids with no further ST services to be rendered at this time given baseline swallow function + diet level achieved. Please re-consult should further needs arise. RECOMMENDATIONS/ASSESSMENT:   Modified Barium Swallow:  MBS is not indicated at this time. Will recommend as appropriate  Diet Recommendations:  Regular diet, thin liquids  Strategies:  Full Upright Position, Small Bite/Sip, Pulmonary Monitoring and Oral Care after all Meals   Rehabilitation Potential: excellent    EDUCATION:  Learner: Patient and Family  Education:  Reviewed results and recommendations of this evaluation, Reviewed diet and strategies, Reviewed ST goals and Plan of Care and Education Related to Prevention of Recurrence of Impairment/Illness/Injury  Evaluation of Education: Verbalizes understanding    PLAN:  No further speech therapy services indicated. PATIENT GOAL:    Return to prior level of function.      CHARLY Poole, Student Intern  Joss Rodrigues M.A., [facility]

## 2021-01-12 PROBLEM — R65.21 E. COLI SEPTIC SHOCK (HCC): Status: ACTIVE | Noted: 2021-01-10

## 2021-01-12 PROBLEM — A41.51 E. COLI SEPTIC SHOCK (HCC): Status: ACTIVE | Noted: 2021-01-10

## 2021-01-12 LAB
ALBUMIN SERPL-MCNC: 2.9 G/DL (ref 3.5–5.1)
ALBUMIN SERPL-MCNC: 3 G/DL (ref 3.5–5.1)
ALP BLD-CCNC: 143 U/L (ref 38–126)
ALP BLD-CCNC: 174 U/L (ref 38–126)
ALT SERPL-CCNC: 109 U/L (ref 11–66)
ALT SERPL-CCNC: 132 U/L (ref 11–66)
ANION GAP SERPL CALCULATED.3IONS-SCNC: 14 MEQ/L (ref 8–16)
ANION GAP SERPL CALCULATED.3IONS-SCNC: 14 MEQ/L (ref 8–16)
AST SERPL-CCNC: 63 U/L (ref 5–40)
AST SERPL-CCNC: 96 U/L (ref 5–40)
BASOPHILS # BLD: 0.3 %
BASOPHILS # BLD: 0.3 %
BASOPHILS ABSOLUTE: 0.1 THOU/MM3 (ref 0–0.1)
BASOPHILS ABSOLUTE: 0.1 THOU/MM3 (ref 0–0.1)
BILIRUB SERPL-MCNC: 0.4 MG/DL (ref 0.3–1.2)
BILIRUB SERPL-MCNC: 0.5 MG/DL (ref 0.3–1.2)
BUN BLDV-MCNC: 44 MG/DL (ref 7–22)
BUN BLDV-MCNC: 47 MG/DL (ref 7–22)
CALCIUM SERPL-MCNC: 7.9 MG/DL (ref 8.5–10.5)
CALCIUM SERPL-MCNC: 8.8 MG/DL (ref 8.5–10.5)
CHLORIDE BLD-SCNC: 106 MEQ/L (ref 98–111)
CHLORIDE BLD-SCNC: 109 MEQ/L (ref 98–111)
CO2: 15 MEQ/L (ref 23–33)
CO2: 15 MEQ/L (ref 23–33)
CREAT SERPL-MCNC: 1.8 MG/DL (ref 0.4–1.2)
CREAT SERPL-MCNC: 2.1 MG/DL (ref 0.4–1.2)
CRENATED RBC'S: ABNORMAL
EOSINOPHIL # BLD: 0 %
EOSINOPHIL # BLD: 0.2 %
EOSINOPHILS ABSOLUTE: 0 THOU/MM3 (ref 0–0.4)
EOSINOPHILS ABSOLUTE: 0.1 THOU/MM3 (ref 0–0.4)
ERYTHROCYTE [DISTWIDTH] IN BLOOD BY AUTOMATED COUNT: 14.3 % (ref 11.5–14.5)
ERYTHROCYTE [DISTWIDTH] IN BLOOD BY AUTOMATED COUNT: 14.5 % (ref 11.5–14.5)
ERYTHROCYTE [DISTWIDTH] IN BLOOD BY AUTOMATED COUNT: 48.3 FL (ref 35–45)
ERYTHROCYTE [DISTWIDTH] IN BLOOD BY AUTOMATED COUNT: 48.7 FL (ref 35–45)
GFR SERPL CREATININE-BSD FRML MDRD: 23 ML/MIN/1.73M2
GFR SERPL CREATININE-BSD FRML MDRD: 27 ML/MIN/1.73M2
GLUCOSE BLD-MCNC: 76 MG/DL (ref 70–108)
GLUCOSE BLD-MCNC: 78 MG/DL (ref 70–108)
HCT VFR BLD CALC: 32.4 % (ref 37–47)
HCT VFR BLD CALC: 32.5 % (ref 37–47)
HEMOGLOBIN: 10.3 GM/DL (ref 12–16)
HEMOGLOBIN: 10.5 GM/DL (ref 12–16)
IMMATURE GRANS (ABS): 1.45 THOU/MM3 (ref 0–0.07)
IMMATURE GRANS (ABS): 4.37 THOU/MM3 (ref 0–0.07)
IMMATURE GRANULOCYTES: 14 %
IMMATURE GRANULOCYTES: 5.1 %
LACTIC ACID: 1.3 MMOL/L (ref 0.5–2.2)
LYMPHOCYTES # BLD: 3.8 %
LYMPHOCYTES # BLD: 5.6 %
LYMPHOCYTES ABSOLUTE: 1.1 THOU/MM3 (ref 1–4.8)
LYMPHOCYTES ABSOLUTE: 1.7 THOU/MM3 (ref 1–4.8)
MCH RBC QN AUTO: 29.4 PG (ref 26–33)
MCH RBC QN AUTO: 29.7 PG (ref 26–33)
MCHC RBC AUTO-ENTMCNC: 31.8 GM/DL (ref 32.2–35.5)
MCHC RBC AUTO-ENTMCNC: 32.3 GM/DL (ref 32.2–35.5)
MCV RBC AUTO: 91.8 FL (ref 81–99)
MCV RBC AUTO: 92.6 FL (ref 81–99)
MONOCYTES # BLD: 2.2 %
MONOCYTES # BLD: 2.8 %
MONOCYTES ABSOLUTE: 0.7 THOU/MM3 (ref 0.4–1.3)
MONOCYTES ABSOLUTE: 0.8 THOU/MM3 (ref 0.4–1.3)
NUCLEATED RED BLOOD CELLS: 0 /100 WBC
NUCLEATED RED BLOOD CELLS: 0 /100 WBC
ORGANISM: ABNORMAL
ORGANISM: ABNORMAL
PATHOLOGIST REVIEW: ABNORMAL
PLATELET # BLD: 132 THOU/MM3 (ref 130–400)
PLATELET # BLD: 133 THOU/MM3 (ref 130–400)
PLATELET ESTIMATE: ADEQUATE
PLATELET ESTIMATE: ADEQUATE
PMV BLD AUTO: 10.9 FL (ref 9.4–12.4)
PMV BLD AUTO: 11 FL (ref 9.4–12.4)
POIKILOCYTES: ABNORMAL
POTASSIUM REFLEX MAGNESIUM: 3.6 MEQ/L (ref 3.5–5.2)
POTASSIUM REFLEX MAGNESIUM: 3.8 MEQ/L (ref 3.5–5.2)
PROCALCITONIN: 80.25 NG/ML (ref 0.01–0.09)
RBC # BLD: 3.5 MILL/MM3 (ref 4.2–5.4)
RBC # BLD: 3.54 MILL/MM3 (ref 4.2–5.4)
SCAN OF BLOOD SMEAR: NORMAL
SCAN OF BLOOD SMEAR: NORMAL
SEG NEUTROPHILS: 77.9 %
SEG NEUTROPHILS: 87.8 %
SEGMENTED NEUTROPHILS ABSOLUTE COUNT: 24.3 THOU/MM3 (ref 1.8–7.7)
SEGMENTED NEUTROPHILS ABSOLUTE COUNT: 25.1 THOU/MM3 (ref 1.8–7.7)
SODIUM BLD-SCNC: 135 MEQ/L (ref 135–145)
SODIUM BLD-SCNC: 138 MEQ/L (ref 135–145)
TOTAL PROTEIN: 5.4 G/DL (ref 6.1–8)
TOTAL PROTEIN: 5.5 G/DL (ref 6.1–8)
TOXIC GRANULATION: PRESENT
URINE CULTURE, ROUTINE: ABNORMAL
URINE CULTURE, ROUTINE: ABNORMAL
WBC # BLD: 28.6 THOU/MM3 (ref 4.8–10.8)
WBC # BLD: 31.2 THOU/MM3 (ref 4.8–10.8)

## 2021-01-12 PROCEDURE — 36592 COLLECT BLOOD FROM PICC: CPT

## 2021-01-12 PROCEDURE — 99232 SBSQ HOSP IP/OBS MODERATE 35: CPT | Performed by: UROLOGY

## 2021-01-12 PROCEDURE — 83605 ASSAY OF LACTIC ACID: CPT

## 2021-01-12 PROCEDURE — 6360000002 HC RX W HCPCS: Performed by: STUDENT IN AN ORGANIZED HEALTH CARE EDUCATION/TRAINING PROGRAM

## 2021-01-12 PROCEDURE — 99233 SBSQ HOSP IP/OBS HIGH 50: CPT | Performed by: INTERNAL MEDICINE

## 2021-01-12 PROCEDURE — 6370000000 HC RX 637 (ALT 250 FOR IP): Performed by: STUDENT IN AN ORGANIZED HEALTH CARE EDUCATION/TRAINING PROGRAM

## 2021-01-12 PROCEDURE — 2580000003 HC RX 258: Performed by: STUDENT IN AN ORGANIZED HEALTH CARE EDUCATION/TRAINING PROGRAM

## 2021-01-12 PROCEDURE — 2060000000 HC ICU INTERMEDIATE R&B

## 2021-01-12 PROCEDURE — 84145 PROCALCITONIN (PCT): CPT

## 2021-01-12 PROCEDURE — 80053 COMPREHEN METABOLIC PANEL: CPT

## 2021-01-12 PROCEDURE — 36415 COLL VENOUS BLD VENIPUNCTURE: CPT

## 2021-01-12 PROCEDURE — 85025 COMPLETE CBC W/AUTO DIFF WBC: CPT

## 2021-01-12 RX ORDER — MODAFINIL 100 MG/1
200 TABLET ORAL DAILY
Status: DISCONTINUED | OUTPATIENT
Start: 2021-01-12 | End: 2021-01-14 | Stop reason: HOSPADM

## 2021-01-12 RX ORDER — MIDODRINE HYDROCHLORIDE 5 MG/1
5 TABLET ORAL
Status: DISCONTINUED | OUTPATIENT
Start: 2021-01-12 | End: 2021-01-12

## 2021-01-12 RX ORDER — MIDODRINE HYDROCHLORIDE 2.5 MG/1
2.5 TABLET ORAL
Status: DISCONTINUED | OUTPATIENT
Start: 2021-01-13 | End: 2021-01-13

## 2021-01-12 RX ADMIN — SODIUM CHLORIDE: 9 INJECTION, SOLUTION INTRAVENOUS at 09:56

## 2021-01-12 RX ADMIN — BISACODYL 10 MG: 5 TABLET ORAL at 08:06

## 2021-01-12 RX ADMIN — PIPERACILLIN AND TAZOBACTAM 3.38 G: 3; .375 INJECTION, POWDER, LYOPHILIZED, FOR SOLUTION INTRAVENOUS at 09:29

## 2021-01-12 RX ADMIN — CEFTRIAXONE SODIUM 2 G: 2 INJECTION, POWDER, FOR SOLUTION INTRAMUSCULAR; INTRAVENOUS at 16:48

## 2021-01-12 RX ADMIN — MIDODRINE HYDROCHLORIDE 5 MG: 5 TABLET ORAL at 11:20

## 2021-01-12 RX ADMIN — PIPERACILLIN AND TAZOBACTAM 3.38 G: 3; .375 INJECTION, POWDER, LYOPHILIZED, FOR SOLUTION INTRAVENOUS at 02:23

## 2021-01-12 RX ADMIN — SODIUM CHLORIDE: 9 INJECTION, SOLUTION INTRAVENOUS at 02:12

## 2021-01-12 RX ADMIN — SODIUM CHLORIDE, PRESERVATIVE FREE 10 ML: 5 INJECTION INTRAVENOUS at 08:07

## 2021-01-12 RX ADMIN — Medication 10 ML: at 15:22

## 2021-01-12 RX ADMIN — MODAFINIL 200 MG: 100 TABLET ORAL at 08:04

## 2021-01-12 RX ADMIN — BISACODYL 10 MG: 5 TABLET ORAL at 21:19

## 2021-01-12 RX ADMIN — LEVOTHYROXINE SODIUM 125 MCG: 125 TABLET ORAL at 06:41

## 2021-01-12 RX ADMIN — MIDODRINE HYDROCHLORIDE 5 MG: 5 TABLET ORAL at 16:48

## 2021-01-12 ASSESSMENT — PAIN SCALES - GENERAL
PAINLEVEL_OUTOF10: 0

## 2021-01-12 NOTE — CARE COORDINATION
DISASTER CHARTING    1/12/21, 2:11 PM EST    DISCHARGE ONGOING EVALUATION:     Lindsey Peabody day: 2  Location: -14/014-A Reason for admit: Sepsis [facility]) [A41.9]   Barriers to Discharge: Levo weaned off today. Plan for HIDA scan if leukocytosis does not improve. Afebrile. NSR. On room air. Ox4. Blood culture x1 +enterobacteriaceae and EColi by PCR. Urine +EColi. CVC. Carrillo. SCDs. IVF, synthroid, midodrine, IV zosyn. CO2 15, BUN 47, Creat 2.1, alb 3, alk phos 143, alt 132, ast 96, wbc up to 31.2, hgb 10.3. PCP: Sarah Felix MD  Patient Goals/Plan/Treatment Preferences: Home alone. Denies needs, declines HH.

## 2021-01-12 NOTE — PROGRESS NOTES
CRITICAL CARE PROGRESS NOTE      Patient:  Leigh Gamble    Unit/Bed:4D-14/014-A  YOB: 1945  MRN: [id number]   PCP: Juli Carrillo MD  Date of Admission: 1/10/2021  Chief Complaint:-Fever and emesis    Assessment and Plan:    1. Septic shock: Secondary to urinary tract infection/nephrolithiasis/acute cholecystitis/possible pneumonia. On arrival procalcitonin 50. 18. Lactic acid has improved and is currently 1.3, however WBC continues to trend up 31.2. Blood cultures positive for gram-negative bacilli x1. Urine cultures growing E. Coli. Blood PCR reports  enterobacteriaceae and escherichia coli. Patient received adequate fluid resuscitation on arrival however still required pressors to maintain adequate blood pressure. Patient is currently requring 3 mcg Levophed. Patient is currently on Zosyn. 2. Acute hypoxic respiratory failure-resolved: Secondary to sepsis +/-  pneumonia. Pneumonia panel pending. Continue Zosyn. 3. Bacteremia:  WBC continues to trend up. Without lactic acidosis. Blood cultures growing gram negative bacilli x1. Urine cultures growing E. Coli. Blood PCR reports enterobacteriaceae and escherichia coli. Continue Zosyn. 4. Left obstructing kidney stone: 6 mm calculus at left ureterovescucal junction. Patient underwent cystoscopy with stent placement per urology, Dr. Melanie Burks early morning 1/11/21. Patient is having very little urine output at this time, urine output that she does have is grossly bloody. Will continue to monitor  5. Pneumonia: Chest x-ray demonstrated infiltrates of right lower lung bases. Pneumonia molecular panel pending. Continue Zosyn. 6. Extensive Cholelithiasis: Demonstrated by CT on 1/10/2021 however patient does not complain of right upper quadrant tenderness and patient had negative Segura sign. Patient's labs do demonstrate transaminitis. Will order right upper quadrant ultrasound to further evaluate gallbladder. We will also order acute hepatic panel. LFTs are trending down nicely. We will continue to monitor and trend ALT WBC and LFTs. Will get HIDA scan if patient's leukocytosis does not improve. 7. UTI: UA reports moderate leukocyte Estrace, many bacteria and 50-75 WBC. Urine cultures growing E. Coli. Continue Zosyn, culture sensitive. 8. Leukocytosis: On arrival patient's WBC is 1.3. Repeat WBC yesterday 23.4. Apparent left shift. Patient was placed on Zosyn at that time. Patient WBC continues to increase to 31.2. This could be due to leukemoid reaction secondary to starting antibiotic therapy. Cultures appear to be sensitive to Zosyn, will continue to monitor and trend WBC  9. History of hypothyroidism: Patient's daughter had a list of medications and doses. Will start levothyroxine 125 mcg daily. 10. History of narcolepsy: Patient states she has a history of narcolepsy and therefore stating she takes both armodafinil 250 mg daily and Modafinil 200 mg daily. We will restart Modafinil 200 mg daily.        INITIAL H AND P AND ICU COURSE: 80-year-old female presented to [facility] with confusion, fever as well as generalized weakness. Patient stated symptoms began the night of 1/8/21 and progressively worsened to the morning of 1/10/2021. Patient called her daughter 1/10/2021 and stated that she not be going to [facility] due to not feeling well. Family members went to check on patient around 4 PM and she is found be febrile, somnolent and confused. Of note her daughter did state patient complained of a vague headache 3 days ago and feeling left-sided pain as well as vomiting multiple times. Patient denies redness of breath, chest pain, abdominal pain. Patient has a significant past medical history of anemia, hypotension, hypothyroidism, kidney stones in which she required surgery of right ureter and laser lithotripsy 5/2015. While in emergency department was found patient had a left-sided distal obstructing kidney stone, 6 mm. Patient was to be taken to the OR however there was a delay in the operating room secondary emergencies. Patient received 3 L fluid bolus while in the ED. Patient was transferred to the ICU for further management. 1/11/21: After several liters of fluid resuscitation patient still required Levophed currently at 9 mcg. Patient also demonstrating very little urine output that is grossly bloody. Patient difficulty time swallowing sips of water, ordered swallow evaluation. Of note patient is also on 2 central nervous system stimulants (armodafinil 250 mg, Modafinil 200 mg ) I am unsure why patient is on these 2 medications as for there is no documentation supporting this. Neurologic:  No focal deficit. No seizures. Following commands    Data: (All radiographs, tracings, PFTs, and imaging are personally viewed and interpreted unless otherwise noted). ? Sodium 135, potassium 3.8, chloride 106, CO2 15, BUN 47, creatinine 2.1, lactic acid 1.3, calcium 7.9  ? Lactic acid trends (on arrival) 1.6, 2.1, 1.3 (most recent)  ? Procalcitonin on arrival 50.18  ? proBNP on arrival 4074.0  ? Albumin 3.0, alk phos 143, , AST 96, bilirubin 0.5, total protein 5.4  ? WBC 31.2, hemoglobin 10.3, hematocrit 32.4, platelets 422  ? 7/88/38 blood cultures x1 positive for E. coli. ? Covid negative  ? UA reports moderate blood, 30 protein, moderate leukocyte Estrace, nitrate negative, no casts, many bacteria  ? Telemetry shows normal sinus rhythm  ? 1/11/21 blood PCR reports ENTERBACTERIACEAE FILM and ESCHERICHIA COLI FILM   ? 1/11/21 gallbladder ultrasound reports 1. Cholelithiasis. Moderate thickening of the gallbladder wall, likely chronic, in light of the negative Segura's sign. 2.  Significantly dilated common bile duct. Cannot exclude distal obstructing lesion although I believe such is relatively unlikely, in light of the fact there is no dilation of intrahepatic biliary radicles. ? 1/11/21 CXR reports: Progressive bilateral pneumonitis  ? 1/10/21 head CT without contrast reports no acute processes  ? 1/10/21 abdominal pelvis CT without contrast reports 1. Obstructive 6 mm calculus at the left ureterovesicular junction. Mild to moderate left hydronephrosis and moderate left perinephritic stranding. 2.  Extensive cholelithiasis. Gallbladder wall thickening and surrounding fluid may reflect acute cholecystitis. 3.  Left hemicolon diverticulosis without diverticulitis    ? 1/10/21 CXR reports mild cardiomegaly without effusion.   Mild atelectasis/pneumonia right lung base inferior medial

## 2021-01-12 NOTE — PROGRESS NOTES
Urology Progress Note    Chief Complaint: weakness, fever  Reason for consult: left sided obstructing stone, fever and tachycardia    Subjective: \"havent had any pain since I had a BM yesterday\"    Patient is sitting up in chair, voiding yellow urine in pfeiffer tubing, pink in bag, +flatus, +BM, ambulating with assistance, tolerating regular diet, denies any nausea or vomiting. There are complaints of no pain at this time. She reports pain no pain since having a large BM yesterday. Off pressors, ambulating, will be transferred from ICU today             Vitals:  /87   Pulse 70   Temp 98 °F (36.7 °C) (Oral)   Resp 22   Ht 5' 2\" (1.575 m)   Wt 169 lb 15.6 oz (77.1 kg)   SpO2 92%   BMI 31.09 kg/m²   Temp  Av.9 °F (36.6 °C)  Min: 97.4 °F (36.3 °C)  Max: 98.5 °F (36.9 °C)    Intake/Output Summary (Last 24 hours) at 2021 1406  Last data filed at 2021 1349  Gross per 24 hour   Intake 3857.63 ml   Output 2000 ml   Net 1857.63 ml       Social History     Socioeconomic History    Marital status:      Spouse name: Not on file    Number of children: Not on file    Years of education: Not on file    Highest education level: Not on file   Occupational History    Not on file   Social Needs    Financial resource strain: Not on file    Food insecurity     Worry: Not on file     Inability: Not on file    Transportation needs     Medical: Not on file     Non-medical: Not on file   Tobacco Use    Smoking status: Never Smoker    Smokeless tobacco: Never Used   Substance and Sexual Activity    Alcohol use:  Yes    Drug use: No    Sexual activity: Not on file   Lifestyle    Physical activity     Days per week: Not on file     Minutes per session: Not on file    Stress: Not on file   Relationships    Social connections     Talks on phone: Not on file     Gets together: Not on file     Attends Oriental orthodox service: Not on file     Active member of club or organization: Not on file Attends meetings of clubs or organizations: Not on file     Relationship status: Not on file    Intimate partner violence     Fear of current or ex partner: Not on file     Emotionally abused: Not on file     Physically abused: Not on file     Forced sexual activity: Not on file   Other Topics Concern    Not on file   Social History Narrative    Not on file     History reviewed. No pertinent family history. No Known Allergies      Constitutional: Alert and oriented times x3, no acute distress, and cooperative to examination with appropriate mood and affect. HEENT:   Head:         Normocephalic and atraumatic. Mucous membranes are normal.   Eyes:         EOM are normal.  Nose:    The external appearance of the nose is normal  Ears: The ears appear normal to external inspection. Cardiovascular:       Normal rate, regular rhythm. Pulmonary/Chest:  Normal respiratory rate and rhthym. No use of accessory muscles. Lungs clear bilaterally. Abdominal:          Soft. No tenderness. Active bowel sounds. Genitalia:    Pfeiffer catheter draining yellow urine in tube, pink in pfeiffer bag. Good UO 1400 overnight, 950ml this AM  Musculoskeletal:    Normal range of motion. He exhibits no edema or tenderness of lower extremities. Extremities:    No cyanosis, clubbing, or edema present. Neurological:    Alert and oriented.      Labs:  WBC:    Lab Results   Component Value Date    WBC 31.2 01/12/2021     Hemoglobin/Hematocrit:    Lab Results   Component Value Date    HGB 10.3 01/12/2021    HCT 32.4 01/12/2021     BMP:    Lab Results   Component Value Date     01/12/2021    K 3.8 01/12/2021     01/12/2021    CO2 15 01/12/2021    BUN 47 01/12/2021    LABALBU 3.0 01/12/2021    CREATININE 2.1 01/12/2021    CALCIUM 7.9 01/12/2021    LABGLOM 23 01/12/2021           Impression:  Septic shock  Left sided obstructing kidney stone- 6 mm at the left UVJ  Pneumonia  CHRISTY  Hx of kidney stones  UTI Cholelithiasis  Constipation         Plan:  WBC 31.2  HGB 10.3  CRT 2.1  Off Levophed    POD # 1 S/P emergent cystoscopy, left sided stent placement by Dr. Marcel Tong due to obstructing stone     Denies flank pain since BM    Good UO 1400 overnight, 950ml this AM.  Hematuria resolved  Ux shows GNB - on Zosyn    Will need definitive treatment of stone outpatient.    Urology will continue to follow - keep pfeiffer in at this time      Dorine Primrose, APRN  01/12/21 2:06 PM  Urology

## 2021-01-12 NOTE — PROGRESS NOTES
ACCEPTING FOR HOSPITALIST SERVICE    1. Assessment/Plan:  2. Septic shock due to uti/bacteremia- resolving. Continue atb. E coli from blood. 3. Renal stone- stented  4. Abnormal cxr; may represent vascular congestion; she is up 13 liters. May need to gently diurese; follow cxr  5. isela- due to stone, shock; trend  6. Elevation lfts- trend- may need HIDA  7. Gallstones- see above        CC:  Pt with prior kidney stones, one kidney small, presented with septic shock, likely due to UTI from renal stone. Needed pressors. Has gallstones, thick gb wall. lfts up but some improving. HPI: see above. .   ROS (12 point review of systems completed. Pertinent positives noted.  Otherwise ROS is negative) : no hx cardiac, pulm dz  PMH:  Per HPI  SHX:  , nonsmoker  FHX: Noncontributory    Allergies: See above    Medications:     sodium chloride 125 mL/hr at 01/12/21 0956      modafinil  200 mg Oral Daily    cefTRIAXone (ROCEPHIN) IV  2 g Intravenous Q24H    [START ON 1/13/2021] midodrine  2.5 mg Oral TID WC    levothyroxine  125 mcg Oral Daily    docusate sodium  100 mg Oral Daily    phosphorus replacement protocol   Other RX Placeholder    calcium replacement protocol   Other RX Placeholder    sodium chloride flush  10 mL Intravenous 2 times per day       Vital Signs:   BP (!) 123/91   Pulse 60   Temp 97.8 °F (36.6 °C) (Oral)   Resp 23   Ht 5' 2\" (1.575 m)   Wt 169 lb 15.6 oz (77.1 kg)   SpO2 96%   BMI 31.09 kg/m²      Intake/Output Summary (Last 24 hours) at 1/12/2021 1802  Last data filed at 1/12/2021 1522  Gross per 24 hour   Intake 4007.63 ml   Output 2000 ml   Net 2007.63 ml        Physical Examination: General appearance - alert, well appearing, and in no distress  Mental status - alert, oriented to person, place, and time  Neck - supple, no significant adenopathy, no JVD, or carotid bruits  Chest - clear to auscultation, no wheezes, rales or rhonchi, symmetric air entry Heart - normal rate, regular rhythm, normal S1, S2, no murmurs, rubs, clicks or gallops  Abdomen - soft, nontender, nondistended, no masses or organomegaly  NO RUQ TENDERNESS  Neurological - alert, oriented, normal speech, no focal findings or movement disorder noted  Musculoskeletal - no muscular tenderness noted  Extremities - no pedal edema noted  Skin - normal coloration and turgor, no rashes, no suspicious skin lesions noted    Data: (All radiographs, tracings, PFTs, and imaging are personally viewed and interpreted unless otherwise noted). ?  REVIEWED LABS, CT IMAGES      Electronically signed by Carolyn Grullon MD on 1/12/2021 at 6:02 PM
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
## 2021-01-13 ENCOUNTER — APPOINTMENT (OUTPATIENT)
Dept: GENERAL RADIOLOGY | Age: 76
DRG: 853 | End: 2021-01-13
Payer: MEDICARE

## 2021-01-13 ENCOUNTER — TELEPHONE (OUTPATIENT)
Dept: UROLOGY | Age: 76
End: 2021-01-13

## 2021-01-13 DIAGNOSIS — Z01.818 PRE-OP TESTING: Primary | ICD-10-CM

## 2021-01-13 DIAGNOSIS — N20.0 KIDNEY STONE: ICD-10-CM

## 2021-01-13 LAB
ALBUMIN SERPL-MCNC: 2.9 G/DL (ref 3.5–5.1)
ALP BLD-CCNC: 203 U/L (ref 38–126)
ALT SERPL-CCNC: 85 U/L (ref 11–66)
ANION GAP SERPL CALCULATED.3IONS-SCNC: 12 MEQ/L (ref 8–16)
AST SERPL-CCNC: 39 U/L (ref 5–40)
BASOPHILS # BLD: 0.3 %
BASOPHILS ABSOLUTE: 0.1 THOU/MM3 (ref 0–0.1)
BILIRUB SERPL-MCNC: 0.3 MG/DL (ref 0.3–1.2)
BUN BLDV-MCNC: 37 MG/DL (ref 7–22)
CALCIUM SERPL-MCNC: 8.9 MG/DL (ref 8.5–10.5)
CHLORIDE BLD-SCNC: 113 MEQ/L (ref 98–111)
CO2: 15 MEQ/L (ref 23–33)
CREAT SERPL-MCNC: 1.7 MG/DL (ref 0.4–1.2)
CRENATED RBC'S: ABNORMAL
EOSINOPHIL # BLD: 0.4 %
EOSINOPHILS ABSOLUTE: 0.1 THOU/MM3 (ref 0–0.4)
ERYTHROCYTE [DISTWIDTH] IN BLOOD BY AUTOMATED COUNT: 14.3 % (ref 11.5–14.5)
ERYTHROCYTE [DISTWIDTH] IN BLOOD BY AUTOMATED COUNT: 48.3 FL (ref 35–45)
GFR SERPL CREATININE-BSD FRML MDRD: 29 ML/MIN/1.73M2
GLUCOSE BLD-MCNC: 66 MG/DL (ref 70–108)
HCT VFR BLD CALC: 32.4 % (ref 37–47)
HEMOGLOBIN: 10.2 GM/DL (ref 12–16)
IMMATURE GRANS (ABS): 0.11 THOU/MM3 (ref 0–0.07)
IMMATURE GRANULOCYTES: 0.4 %
LYMPHOCYTES # BLD: 5.6 %
LYMPHOCYTES ABSOLUTE: 1.4 THOU/MM3 (ref 1–4.8)
MCH RBC QN AUTO: 29 PG (ref 26–33)
MCHC RBC AUTO-ENTMCNC: 31.5 GM/DL (ref 32.2–35.5)
MCV RBC AUTO: 92 FL (ref 81–99)
MONOCYTES # BLD: 2.9 %
MONOCYTES ABSOLUTE: 0.7 THOU/MM3 (ref 0.4–1.3)
MRSA SCREEN: NORMAL
NUCLEATED RED BLOOD CELLS: 0 /100 WBC
PLATELET # BLD: 146 THOU/MM3 (ref 130–400)
PLATELET ESTIMATE: ADEQUATE
PMV BLD AUTO: 11 FL (ref 9.4–12.4)
POTASSIUM REFLEX MAGNESIUM: 3.7 MEQ/L (ref 3.5–5.2)
PROLACTIN: 32.4 NG/ML
RBC # BLD: 3.52 MILL/MM3 (ref 4.2–5.4)
SCAN OF BLOOD SMEAR: NORMAL
SEG NEUTROPHILS: 90.4 %
SEGMENTED NEUTROPHILS ABSOLUTE COUNT: 23.1 THOU/MM3 (ref 1.8–7.7)
SODIUM BLD-SCNC: 140 MEQ/L (ref 135–145)
TOTAL PROTEIN: 5 G/DL (ref 6.1–8)
TOXIC GRANULATION: PRESENT
WBC # BLD: 25.6 THOU/MM3 (ref 4.8–10.8)

## 2021-01-13 PROCEDURE — 36415 COLL VENOUS BLD VENIPUNCTURE: CPT

## 2021-01-13 PROCEDURE — 2580000003 HC RX 258: Performed by: INTERNAL MEDICINE

## 2021-01-13 PROCEDURE — 71045 X-RAY EXAM CHEST 1 VIEW: CPT

## 2021-01-13 PROCEDURE — 2060000000 HC ICU INTERMEDIATE R&B

## 2021-01-13 PROCEDURE — 99232 SBSQ HOSP IP/OBS MODERATE 35: CPT | Performed by: NURSE PRACTITIONER

## 2021-01-13 PROCEDURE — 2580000003 HC RX 258: Performed by: STUDENT IN AN ORGANIZED HEALTH CARE EDUCATION/TRAINING PROGRAM

## 2021-01-13 PROCEDURE — 80053 COMPREHEN METABOLIC PANEL: CPT

## 2021-01-13 PROCEDURE — 6370000000 HC RX 637 (ALT 250 FOR IP): Performed by: STUDENT IN AN ORGANIZED HEALTH CARE EDUCATION/TRAINING PROGRAM

## 2021-01-13 PROCEDURE — 6360000002 HC RX W HCPCS: Performed by: STUDENT IN AN ORGANIZED HEALTH CARE EDUCATION/TRAINING PROGRAM

## 2021-01-13 PROCEDURE — 94760 N-INVAS EAR/PLS OXIMETRY 1: CPT

## 2021-01-13 PROCEDURE — 99232 SBSQ HOSP IP/OBS MODERATE 35: CPT | Performed by: INTERNAL MEDICINE

## 2021-01-13 PROCEDURE — 85025 COMPLETE CBC W/AUTO DIFF WBC: CPT

## 2021-01-13 PROCEDURE — 84146 ASSAY OF PROLACTIN: CPT

## 2021-01-13 RX ORDER — FUROSEMIDE 10 MG/ML
40 INJECTION INTRAMUSCULAR; INTRAVENOUS ONCE
Status: COMPLETED | OUTPATIENT
Start: 2021-01-13 | End: 2021-01-13

## 2021-01-13 RX ORDER — CEFDINIR 300 MG/1
300 CAPSULE ORAL DAILY
Status: DISCONTINUED | OUTPATIENT
Start: 2021-01-14 | End: 2021-01-14

## 2021-01-13 RX ADMIN — SODIUM CHLORIDE: 9 INJECTION, SOLUTION INTRAVENOUS at 05:58

## 2021-01-13 RX ADMIN — MODAFINIL 200 MG: 100 TABLET ORAL at 09:27

## 2021-01-13 RX ADMIN — LEVOTHYROXINE SODIUM 125 MCG: 125 TABLET ORAL at 05:58

## 2021-01-13 RX ADMIN — SODIUM CHLORIDE, PRESERVATIVE FREE 10 ML: 5 INJECTION INTRAVENOUS at 09:27

## 2021-01-13 RX ADMIN — BISACODYL 10 MG: 5 TABLET ORAL at 20:55

## 2021-01-13 RX ADMIN — FUROSEMIDE 40 MG: 10 INJECTION, SOLUTION INTRAMUSCULAR; INTRAVENOUS at 11:23

## 2021-01-13 ASSESSMENT — PAIN SCALES - WONG BAKER
WONGBAKER_NUMERICALRESPONSE: 0

## 2021-01-13 ASSESSMENT — PAIN SCALES - GENERAL
PAINLEVEL_OUTOF10: 0
PAINLEVEL_OUTOF10: 0

## 2021-01-13 NOTE — CARE COORDINATION
DISASTER CHARTING    1/13/21, 12:57 PM EST    DISCHARGE ONGOING EVALUATION:     Rebekah Aid day: 3  Location: Count includes the [facility]24/024-A Reason for admit: Sepsis [facility]) [A41.9]   Barriers to Discharge: From ICU, 1/11 Left Ureteral Stent. UTI. WBC 25.6; monitor.  IV AB, IVF continued  PCP: Florencia Stevens MD  Readmission Risk Score: 11%  Patient Goals/Plan/Treatment Preferences: plans home alone, denied needs

## 2021-01-13 NOTE — PROGRESS NOTES
Hospitalist Progress Note    Patient:  Surya Augusta      Unit/Bed:4K-24/024-A    YOB: 1945    MRN: [id number]       Acct: [de-identified]     PCP: Tatyana Escalante MD    Date of Admission: 1/10/2021    Assessment/Plan:  Bacteremia: WBC continues to trend up.  Without lactic acidosis.  Blood cultures growing gram negative bacilli x1.  Urine cultures growing E. Coli.  Blood PCR reports enterobacteriaceae and escherichia coli.  Continue Zosyn. 1/13/21: WBC is still elevated. Patient afebrile. Transition to cefdinir. Left obstructing nephrolithiasis: 6 mm calculus at left ureterovescucal junction. 1/11/21: Cystoscopy with stent placement per urology, Dr. Diamante Nolen is having minimal bloody urine output. 1/13/21: Discontinue Carrillo. Strict I&O's. One dose IV lasix 40mg given for fluid overload. ? Pneumonia: Patient denies cough. Chest x-ray demonstrated infiltrates of right lower lung bases. Pneumonia molecular panel pending. 1/11/21: Continue Zosyn. 1/13/21: Pneumonia panel still pending. Transition to Cefdinir. Complicated UTI: Moderate leukocyte esterase, many bacteria, and 50-75 WBC. Urine cultures growing E. Coli.  Continue Zosyn; culture sensitive. 1/13/21: Transition to Cefdinir    Cholelithiasis: Asymptomatic. Without abdominal tenderness, jaundice, or complaint of pain. Demonstrated by CT on 1/10/2021.  1/11/21: RUQ U/S  1/12/21: HIDA scan tomorrow  1/13/21: Patient declined HIDA scan. She has no complaint of abdominal pain and wants to f/u outpatient    Transaminitis - Secondary to cholelithiasis. 1/11/21: Hepatic panel. LFTs are trending down. 1/13/21: Resolving. Continue to trend. Acute Leukocytosis: On arrival WBC=1.3. Repeat WBC on 1/11/12 demonstrated 23.4 with left shift. 1/11/12: Zosyn started. 1/12/21: WBC=31.2 potentially leukomoid reaction secondary to Abx initiation. Continue to trend. Cultures sensitive to current ABx regimen of Zosyn. 1/13/21: Transition to oral Cefdinir. WBC trending down. WBC=25.6    Septic shock - Resolved. Present on arrival. Secondary to urinary tract infection/nephrolithiasis/acute cholecystitis/possible pneumonia. SIRS 4/4 (Tc=985, Temp=102.6, RR=20, WBC=1.3) with known source. Blood cultures positive for gram-negative bacilli x1. Urine cultures growing E. Coli. Blood PCR reports enterobacteriaceae and escherichia coli.  Zosyn started. Unresponsive to 30cc/kf fluid resuscitation and required pressors to maintain adequate blood pressure. Levophed started and patient admitted to ICU care. 1/12/21: Patient transferred to stepdown unit    Acute hypoxic respiratory failure - Resolved. No supplemental O2 use at home. Requires 2LPM via NC to maintain SpO2>90%. Likely secondary to sepsis. Hx of hypothyroidism - Resume llevothyroxine 125 mcg daily. History of narcolepsy - Noted. Resume Modafinil 200mg daily. Hold armodafinil.         Expected discharge date:  1/14/21    Disposition:    [x] Home       [] TCU       [] Rehab       [] Psych       [] SNF       [] [place]       [] Other-    Chief Complaint: Fever and emesis    Hospital Course:  Augustina Stewart is a 77 y.o. female with PMHx of hypothyroidism, anemia, and nephrolithiasis who presented to [facility] emergency department with complaint of generalized weakness, confusion, fever, nausea, and emesis. Patient is accompanied by her daughter-in-law who stated the patient has not been feeling well for a few days prior to presentation. The patient's family became concerned when they checked on patient and she was febrile, somnolent, and confused. There appears to be no aggravating or alleviating factors. Patient denied chest pain, palpitations, shortness of breath, cough, abdominal pain, diarrhea, dysuria, or hematuria. Patient admitted to weakness, fevers, confusion, headache, nausea, and vomiting. ED vitals: Temp 102.6°F, BP 91/56, , RR 20, SPO2 96% on 2 L/min NC. Sepsis was suspected and the patient received 30 cc/kg fluid bolus, started on Zosyn and vancomycin. Chest x-ray demonstrated minimal atelectasis/pneumonia of the right lung base inferomedially. CT abdomen/pelvis demonstrated obstructive 6 mm calculus at the left UVJ. Mild-moderate left hydronephrosis, and left perinephric stranding. Cholelithiasis; gallbladder wall thickening and surrounding fluid indicating possible acute cholecystitis. Multiple small dependent gallstones found within the lumen of the gallbladder with normal caliber CBD without intraluminal calcified stone. 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 The patient was unresponsive to fluid resuscitation and admitted to the ICU service for further care, management, and blood pressure support. 1/11/21: Patient was weaned from supplemental oxygen and norepinephrine. Patient was started on midodrine 5mg 3 times daily. .    1/12/21: Patient was transferred from the ICU to stepdown unit for further care. Subjective (past 24 hours):  Per nursing staff there were no acute events overnight. The patient was seen and examined with complaint of pain secondary to Carrillo catheter. She requested that we remove it today. She stated that she is not interested in having a HIDA scan completed today and that she would prefer going home. She has had many personal setbacks in her life recently and stated that she needs to tend to those issues. She requested that we \"prime\" her up enough to be discharged without extra work-up for non-emergent items. Plan was made to provide one dose IV lasix 40mg for fluid overload and evaluate in the morning. The patient was agreeable to this plan. ROS (12 point review of systems completed. Pertinent positives noted. Otherwise ROS is negative). Medications:  Reviewed    Infusion Medications    sodium chloride 75 mL/hr at 01/13/21 7463     Scheduled Medications    modafinil  200 mg Oral Daily    cefTRIAXone (ROCEPHIN) IV  2 g Intravenous Q24H    midodrine  2.5 mg Oral TID WC    levothyroxine  125 mcg Oral Daily    docusate sodium  100 mg Oral Daily    phosphorus replacement protocol   Other RX Placeholder    calcium replacement protocol   Other RX Placeholder    sodium chloride flush  10 mL Intravenous 2 times per day     PRN Meds: bisacodyl, oxybutynin, opium-belladonna, sodium chloride flush, acetaminophen **OR** acetaminophen      Intake/Output Summary (Last 24 hours) at 1/13/2021 1607  Last data filed at 1/13/2021 1432  Gross per 24 hour   Intake 1574.8 ml   Output 2625 ml   Net -1050.2 ml       Diet:  DIET GENERAL;    Exam:  /76   Pulse 60   Temp 98.5 °F (36.9 °C) (Oral)   Resp 20   Ht 5' 2\" (1.575 m)   Wt 172 lb 6.4 oz (78.2 kg)   SpO2 95%   BMI 31.53 kg/m²     Physical Exam  Vitals signs and nursing note reviewed. Constitutional:       Appearance: Normal appearance. She is obese. HENT:      Head: Normocephalic and atraumatic. Right Ear: External ear normal.      Left Ear: External ear normal.      Nose: Nose normal.      Mouth/Throat:      Mouth: Mucous membranes are moist.      Pharynx: Oropharynx is clear. Eyes:      Conjunctiva/sclera: Conjunctivae normal.      Pupils: Pupils are equal, round, and reactive to light. Neck:      Musculoskeletal: Normal range of motion and neck supple. Cardiovascular:      Rate and Rhythm: Normal rate and regular rhythm. Pulses: Normal pulses. Heart sounds: Normal heart sounds.    Pulmonary:      Effort: Pulmonary effort is normal. Breath sounds: Examination of the right-upper field reveals wheezing. Examination of the left-upper field reveals wheezing. Examination of the right-lower field reveals rhonchi. Examination of the left-lower field reveals rhonchi. Wheezing and rhonchi present. Abdominal:      General: Bowel sounds are normal.      Palpations: Abdomen is soft. Musculoskeletal: Normal range of motion. Skin:     General: Skin is warm and dry. Capillary Refill: Capillary refill takes less than 2 seconds. Neurological:      General: No focal deficit present. Mental Status: She is alert and oriented to person, place, and time. Mental status is at baseline. Psychiatric:         Attention and Perception: Attention and perception normal.         Mood and Affect: Mood is anxious. Affect is tearful. Behavior: Behavior normal.         Thought Content: Thought content normal.         Judgment: Judgment normal.             Labs:   Recent Labs     01/12/21  0403 01/12/21  1525 01/13/21  0349   WBC 31.2* 28.6* 25.6*   HGB 10.3* 10.5* 10.2*   HCT 32.4* 32.5* 32.4*    132 146     Recent Labs     01/12/21  0403 01/12/21  1525 01/13/21  0349    138 140   K 3.8 3.6 3.7    109 113*   CO2 15* 15* 15*   BUN 47* 44* 37*   CREATININE 2.1* 1.8* 1.7*   CALCIUM 7.9* 8.8 8.9     Recent Labs     01/12/21  0403 01/12/21  1525 01/13/21  0349   AST 96* 63* 39   * 109* 85*   BILITOT 0.5 0.4 0.3   ALKPHOS 143* 174* 203*     No results for input(s): INR in the last 72 hours. No results for input(s): Phyllis Piter in the last 72 hours. Microbiology:      Urinalysis:      Lab Results   Component Value Date    NITRU NEGATIVE 01/10/2021    WBCUA 50-75 01/10/2021    BACTERIA MANY 01/10/2021    RBCUA 0-2 01/10/2021    BLOODU MODERATE 01/10/2021    SPECGRAV 1.014 01/10/2021       Radiology:  XR CHEST PORTABLE   Final Result   Impression:   Left basilar atelectasis versus infiltrate, new. This document has been electronically signed by: Lisa Avila MD on    01/13/2021 02:49 AM         US GALLBLADDER RUQ   Final Result   1. Cholelithiasis. Moderate thickening of the gallbladder wall, likely chronic, in light of the negative Segura's sign. 2. Significantly dilated common bile duct. Cannot exclude distal obstructing lesion although I believe such is relatively unlikely, in light of the fact that there are no dilated intrahepatic biliary radicles. MRCP might be considered if further    evaluation desired. .              **This report has been created using voice recognition software. It may contain minor errors which are inherent in voice recognition technology. **      Final report electronically signed by Dr. Cari Jorge on 1/11/2021 10:37 AM      XR CHEST PORTABLE   Final Result   Impression:   Satisfactory position of right IJ CVP catheter. No complicating    pneumothorax. Progressive bilateral pneumonitis. This document has been electronically signed by: Padma Rizvi MD on    01/11/2021 04:27 AM         CT ABDOMEN PELVIS WO CONTRAST Additional Contrast? None   Final Result   1. Obstructive 6 mm calculus at the left ureterovesical junction. Mild    to moderate left hydronephrosis and moderate left perinephric stranding. 2.  Extensive cholelithiasis. Gallbladder wall thickening and surrounding    fluid may reflect acute cholecystitis. 3.  Left hemicolon diverticulosis without diverticulitis. 4.  Other chronic findings above. This document has been electronically signed by: Padma Rizvi MD on    01/10/2021 10:22 PM      All CT scans at this facility use dose modulation, iterative    reconstruction, and/or weight-based   dosing when appropriate to reduce radiation dose to as low as reasonably    achievable. XR CHEST PORTABLE   Final Result   1. Mild cardiomegaly. No effusion. 2. Minimal atelectasis/pneumonia right lung base inferomedially. **This report has been created using voice recognition software. It may contain minor errors which are inherent in voice recognition technology. **      Final report electronically signed by Dr. Eve Goddard on 1/10/2021 5:55 PM      CT Head WO Contrast   Final Result   No acute intracranial process. **This report has been created using voice recognition software. It may contain minor errors which are inherent in voice recognition technology. **      Final report electronically signed by Dr. Eve Goddard on 1/10/2021 5:54 PM          DVT prophylaxis: [] Lovenox                                 [x] SCDs                                 [] SQ Heparin                                 [] Encourage ambulation           [] Already on Anticoagulation     Code Status: Full Code    PT/OT Eval Status: None    Tele:   [x] yes             [] no    Electronically signed by Carlos Pham DO on 1/13/2021 at 4:07 PM

## 2021-01-13 NOTE — TELEPHONE ENCOUNTER
SURGERY [address] [place] [address] [facility] [address] [address]      Phone *[phone] *[phone]   Surgical Scheduling Direct Line Phone *[phone] Fax *[phone]      Duong Case 1945 female    [address]  [place] [address]   Marital Status:          Home Phone: [phone]      Cell Phone:    Telephone Information:   Mobile [phone]          Surgeon: Dr. Fabricio Leonard  Surgery Date: 2/1/21   Time: 8:30 am    Procedure: Cystoscopy, left ureteroscopy, laser lithotripsy, basket retrieval of stone fragments,  left ureteral stent exchange    Diagnosis: Kidney Elis Venegas     Important Medical History: In [facility]    Special Inst/Equip:     CPT Codes:    34996  Latex Allergy:  No     Cardiac Device:  No     Anesthesia:  Anesthesiologist (General/Spinal)          Admission Type:  Same Day                             Admit Prior to Day of Surgery: No    Case Location:  Main OR           Preadmission Testing:Phone Call              PAT Date and Time:______________________________________________________    PAT Confirmation #: ______________________________________________________    Post Op Visit: ___________________________________________________________    Need Preop Cardiac Clearance: No    Does Patient have Cardiologist/physician?      None    Surgery Confirmation #: __________________________________________________    : ________________________   Date: __________________________     [employer] Name: Medicare

## 2021-01-13 NOTE — TELEPHONE ENCOUNTER
Pt currently inpatient. Expected discharge in next 24-48 hrs. S/p emergent left ureteral stent by Dr. Oanh Teague 1- for obstructing stone with sepsis. Please contact pt to be scheduled for   Cystoscopy, left ureteroscopy, laser lithotripsy, basket retrieval of stone fragments, and possible left ureteral stent exchange per Dr. Oanh Teague in 2-3 weeks. Get urine prior to OR.      Thank you

## 2021-01-13 NOTE — PROGRESS NOTES
Urology Progress Note    Chief Complaint: weakness, fatigue, obstructing stone  Reason for consult: left sided obstructing stone, fever and tachycardia    Subjective: \"Im doing better\"    Patient is resting in bed, voiding yellow urine in pfeiffer tubing, +flatus, +BM, ambulating with assistance, tolerating regular diet, denies any nausea or vomiting. There are complaints of no pain at this time. Transferred to stepdown yesterday. Afebrile. Denies chest pain, or SOB, or calf pain. Vitals:  /76   Pulse 60   Temp 98.5 °F (36.9 °C) (Oral)   Resp 20   Ht 5' 2\" (1.575 m)   Wt 172 lb 6.4 oz (78.2 kg)   SpO2 95%   BMI 31.53 kg/m²   Temp  Av.2 °F (36.8 °C)  Min: 97.6 °F (36.4 °C)  Max: 98.5 °F (36.9 °C)    Intake/Output Summary (Last 24 hours) at 2021 1242  Last data filed at 2021 1130  Gross per 24 hour   Intake 3193.14 ml   Output 3225 ml   Net -31.86 ml       Social History     Socioeconomic History    Marital status:      Spouse name: Not on file    Number of children: Not on file    Years of education: Not on file    Highest education level: Not on file   Occupational History    Not on file   Social Needs    Financial resource strain: Not on file    Food insecurity     Worry: Not on file     Inability: Not on file    Transportation needs     Medical: Not on file     Non-medical: Not on file   Tobacco Use    Smoking status: Never Smoker    Smokeless tobacco: Never Used   Substance and Sexual Activity    Alcohol use:  Yes    Drug use: No    Sexual activity: Not on file   Lifestyle    Physical activity     Days per week: Not on file     Minutes per session: Not on file    Stress: Not on file   Relationships    Social connections     Talks on phone: Not on file     Gets together: Not on file     Attends Bahai service: Not on file     Active member of club or organization: Not on file     Attends meetings of clubs or organizations: Not on file Relationship status: Not on file    Intimate partner violence     Fear of current or ex partner: Not on file     Emotionally abused: Not on file     Physically abused: Not on file     Forced sexual activity: Not on file   Other Topics Concern    Not on file   Social History Narrative    Not on file     History reviewed. No pertinent family history. No Known Allergies      Constitutional: Alert and oriented times x3, no acute distress, and cooperative to examination with appropriate mood and affect. HEENT:   Head:         Normocephalic and atraumatic. Mucous membranes are normal.   Eyes:         EOM are normal.  Nose:    The external appearance of the nose is normal  Ears: The ears appear normal to external inspection. Cardiovascular:       Normal rate, regular rhythm. Pulmonary/Chest:  Normal respiratory rate and rhthym. No use of accessory muscles. Lungs clear bilaterally. Abdominal:          Soft. No tenderness. Active bowel sounds. Genitalia:    Carrillo catheter draining yellow urine in color. Musculoskeletal:    Normal range of motion. Upper extremity edema  Extremities:    No cyanosis, clubbing, or edema present. Neurological:    Alert and oriented.      Labs:  WBC:    Lab Results   Component Value Date    WBC 25.6 01/13/2021     Hemoglobin/Hematocrit:    Lab Results   Component Value Date    HGB 10.2 01/13/2021    HCT 32.4 01/13/2021     BMP:    Lab Results   Component Value Date     01/13/2021    K 3.7 01/13/2021     01/13/2021    CO2 15 01/13/2021    BUN 37 01/13/2021    LABALBU 2.9 01/13/2021    CREATININE 1.7 01/13/2021    CALCIUM 8.9 01/13/2021    LABGLOM 29 01/13/2021 1/12/2021  6:29 PM - Kirk, [facility] Incoming Lab Results From [facility]    Specimen Information: Blood        Component Collected Lab   Blood Culture, Routine Abnormal  01/10/2021  6:43  [facility]   No growth-preliminary Organism Abnormal  01/10/2021  6:43  [facility]   Escherichia coli    Testing Performed By    Lab - Abbreviation Name Director Address Valid Date Range   [facility] - [id number] [address]  [facility] [address] 08/30/17 0855-Present   Narrative  Performed by: [facility]  Source: blood-Adult-suboptimal <5.5oz./set volume       Site: (single bottle)Peripheral;             Current Antibiotics: Ceftriaxone   Susceptibility    Escherichia coli (1)    Antibiotic Interpretation HOLA Status    cefTRIAXone Sensitive <=1 mcg/mL Final    gentamicin Sensitive <=1 mcg/mL Final    trimethoprim-sulfamethoxazole Sensitive <=20 mcg/mL Final          1/12/2021  9:58 PM - Patricia Bradley Incoming Lab Results From [facility]    Specimen Information: Bladder; Body Fluid        Component Collected Lab   Organism Abnormal  01/11/2021  4:40 AM  - [facility]   Escherichia coli    Urine Culture, Routine 01/11/2021  4:40 AM  - [address] [place] [address] Lab   [place] count: <10,000 CFU/mL     Testing Performed By    Lab - [address] Name Director Address Valid Date Range   [address] - [address] LAB Julio Esquivel  [facility] [address] 08/30/17 0855-Present   Narrative  Performed by: [facility]  Source: cath urine       Site:           Current Antibiotics: Ceftriaxone   Susceptibility    Escherichia coli (1)    Antibiotic Interpretation HOLA Status    amoxicillin-clavulanate Sensitive 4 mcg/mL Final    cefOXitin Sensitive <=4 mcg/mL Final    cefTRIAXone Sensitive <=1 mcg/mL Final    ampicillin Intermediate 16 mcg/mL Final    gentamicin Sensitive <=1 mcg/mL Final    trimethoprim-sulfamethoxazole Sensitive <=20 mcg/mL Final    tetracycline Sensitive <=1 mcg/mL Final    nitrofurantoin Sensitive <=16 mcg/mL Final    Lab and Collection        Impression:    Septic shock Left sided obstructing kidney stone- 6 mm at the left UVJ, s/p left stent placement   Pneumonia  CHRISTY  Hx of kidney stones  UTI  Cholelithiasis      Plan:      POD # 2 S/P emergent cystoscopy, left sided stent placement by Dr. Nando Wall due to obstructing stone   Tolerating stent well. Good UO, given lasix today for fluid volume overload. [id number] Ana Paula Jackson from Urology standpoint to have pfeiffer removed, keep accurate I and O. Will need definitive treatment of stone outpatient, our office will contact pt to schedule, per pt preference    Blood and Urine cultures with E. Coli- recommend 10-14 days of antibiotics at discharge.         Urology will continue to follow       IRMA Morgan  01/13/21 12:42 PM  Urology Detail Level: Generalized Detail Level: Simple Detail Level: Zone

## 2021-01-14 VITALS
HEART RATE: 65 BPM | WEIGHT: 167.3 LBS | HEIGHT: 62 IN | BODY MASS INDEX: 30.79 KG/M2 | RESPIRATION RATE: 18 BRPM | TEMPERATURE: 97.4 F | OXYGEN SATURATION: 97 % | DIASTOLIC BLOOD PRESSURE: 103 MMHG | SYSTOLIC BLOOD PRESSURE: 155 MMHG

## 2021-01-14 LAB
ALBUMIN SERPL-MCNC: 2.8 G/DL (ref 3.5–5.1)
ALP BLD-CCNC: 202 U/L (ref 38–126)
ALT SERPL-CCNC: 65 U/L (ref 11–66)
ANION GAP SERPL CALCULATED.3IONS-SCNC: 11 MEQ/L (ref 8–16)
AST SERPL-CCNC: 24 U/L (ref 5–40)
BASOPHILS # BLD: 0.4 %
BASOPHILS ABSOLUTE: 0 THOU/MM3 (ref 0–0.1)
BILIRUB SERPL-MCNC: 0.3 MG/DL (ref 0.3–1.2)
BLOOD CULTURE, ROUTINE: ABNORMAL
BUN BLDV-MCNC: 32 MG/DL (ref 7–22)
CALCIUM SERPL-MCNC: 9.9 MG/DL (ref 8.5–10.5)
CHLORIDE BLD-SCNC: 111 MEQ/L (ref 98–111)
CO2: 19 MEQ/L (ref 23–33)
CREAT SERPL-MCNC: 1.5 MG/DL (ref 0.4–1.2)
EOSINOPHIL # BLD: 2.3 %
EOSINOPHILS ABSOLUTE: 0.3 THOU/MM3 (ref 0–0.4)
ERYTHROCYTE [DISTWIDTH] IN BLOOD BY AUTOMATED COUNT: 13.8 % (ref 11.5–14.5)
ERYTHROCYTE [DISTWIDTH] IN BLOOD BY AUTOMATED COUNT: 45.8 FL (ref 35–45)
GFR SERPL CREATININE-BSD FRML MDRD: 34 ML/MIN/1.73M2
GLUCOSE BLD-MCNC: 85 MG/DL (ref 70–108)
HCT VFR BLD CALC: 34.6 % (ref 37–47)
HEMOGLOBIN: 11.1 GM/DL (ref 12–16)
IMMATURE GRANS (ABS): 0.17 THOU/MM3 (ref 0–0.07)
IMMATURE GRANULOCYTES: 1.4 %
LYMPHOCYTES # BLD: 16.3 %
LYMPHOCYTES ABSOLUTE: 2 THOU/MM3 (ref 1–4.8)
MAGNESIUM: 1.9 MG/DL (ref 1.6–2.4)
MCH RBC QN AUTO: 29 PG (ref 26–33)
MCHC RBC AUTO-ENTMCNC: 32.1 GM/DL (ref 32.2–35.5)
MCV RBC AUTO: 90.3 FL (ref 81–99)
MONOCYTES # BLD: 5 %
MONOCYTES ABSOLUTE: 0.6 THOU/MM3 (ref 0.4–1.3)
NUCLEATED RED BLOOD CELLS: 0 /100 WBC
ORGANISM: ABNORMAL
PLATELET # BLD: 184 THOU/MM3 (ref 130–400)
PMV BLD AUTO: 10.6 FL (ref 9.4–12.4)
POTASSIUM REFLEX MAGNESIUM: 3.5 MEQ/L (ref 3.5–5.2)
RBC # BLD: 3.83 MILL/MM3 (ref 4.2–5.4)
SEG NEUTROPHILS: 74.6 %
SEGMENTED NEUTROPHILS ABSOLUTE COUNT: 9.2 THOU/MM3 (ref 1.8–7.7)
SODIUM BLD-SCNC: 141 MEQ/L (ref 135–145)
TOTAL PROTEIN: 5.6 G/DL (ref 6.1–8)
WBC # BLD: 12.3 THOU/MM3 (ref 4.8–10.8)

## 2021-01-14 PROCEDURE — 6370000000 HC RX 637 (ALT 250 FOR IP): Performed by: STUDENT IN AN ORGANIZED HEALTH CARE EDUCATION/TRAINING PROGRAM

## 2021-01-14 PROCEDURE — 99238 HOSP IP/OBS DSCHRG MGMT 30/<: CPT | Performed by: INTERNAL MEDICINE

## 2021-01-14 PROCEDURE — 83735 ASSAY OF MAGNESIUM: CPT

## 2021-01-14 PROCEDURE — 85025 COMPLETE CBC W/AUTO DIFF WBC: CPT

## 2021-01-14 PROCEDURE — 36556 INSERT NON-TUNNEL CV CATH: CPT | Performed by: FAMILY MEDICINE

## 2021-01-14 PROCEDURE — 80053 COMPREHEN METABOLIC PANEL: CPT

## 2021-01-14 PROCEDURE — 36415 COLL VENOUS BLD VENIPUNCTURE: CPT

## 2021-01-14 RX ORDER — CEFDINIR 300 MG/1
300 CAPSULE ORAL EVERY 12 HOURS SCHEDULED
Status: DISCONTINUED | OUTPATIENT
Start: 2021-01-14 | End: 2021-01-14 | Stop reason: HOSPADM

## 2021-01-14 RX ORDER — OXYBUTYNIN CHLORIDE 5 MG/1
5 TABLET ORAL 3 TIMES DAILY PRN
Qty: 90 TABLET | Refills: 3 | Status: SHIPPED | OUTPATIENT
Start: 2021-01-14

## 2021-01-14 RX ORDER — PSEUDOEPHEDRINE HCL 30 MG
100 TABLET ORAL DAILY
Qty: 30 CAPSULE | Refills: 0 | Status: SHIPPED | OUTPATIENT
Start: 2021-01-14 | End: 2021-02-13

## 2021-01-14 RX ORDER — POTASSIUM CHLORIDE 20 MEQ/1
20 TABLET, EXTENDED RELEASE ORAL ONCE
Status: COMPLETED | OUTPATIENT
Start: 2021-01-14 | End: 2021-01-14

## 2021-01-14 RX ORDER — CEFDINIR 300 MG/1
300 CAPSULE ORAL EVERY 12 HOURS SCHEDULED
Qty: 20 CAPSULE | Refills: 0 | Status: SHIPPED | OUTPATIENT
Start: 2021-01-14 | End: 2021-01-24

## 2021-01-14 RX ADMIN — CEFDINIR 300 MG: 300 CAPSULE ORAL at 10:39

## 2021-01-14 RX ADMIN — POTASSIUM CHLORIDE 20 MEQ: 1500 TABLET, EXTENDED RELEASE ORAL at 10:00

## 2021-01-14 RX ADMIN — MODAFINIL 200 MG: 100 TABLET ORAL at 10:39

## 2021-01-14 RX ADMIN — LEVOTHYROXINE SODIUM 125 MCG: 125 TABLET ORAL at 06:36

## 2021-01-14 ASSESSMENT — PAIN SCALES - WONG BAKER: WONGBAKER_NUMERICALRESPONSE: 0

## 2021-01-14 NOTE — PROGRESS NOTES
Renal Adjustment Follow-up    Recent Labs     01/13/21  0349 01/14/21  0418   BUN 37* 32*       Recent Labs     01/13/21  0349 01/14/21  0418   CREATININE 1.7* 1.5*       Estimated Creatinine Clearance: 31 mL/min (A) (based on SCr of 1.5 mg/dL (H)). Plan: Adjusted dose to cefdinir 300 mg BID for improvement in SCr, treating bacteremia.     Ann De LeonD, BCPS   1/14/2021  7:43 AM

## 2021-01-14 NOTE — DISCHARGE SUMMARY
Hospital Medicine Discharge Summary      Patient Identification:   Name: Samm Mccarthy   : 1945  MRN: [id number]   Account: [de-identified]      Patient's PCP: Cipriano Valle MD    Admit Date: 1/10/2021     Discharge Date:   21    Admitting Physician: Taz Whitt MD     Discharge Physician: Lorena Alejandre DO         HPI:  Samm Mccarthy is a 77 y.o. female who presented to [facility] for weakness, confusion, nausea, and emesis. Please see chart for more details regarding HPI. Hospital Course:   Samm Mccarthy is a 77 y.o. female admitted to [facility] on 1/10/2021 for fever, nausea, emesis, and generalized weakness. Patient is accompanied by her daughter-in-law who stated the patient has not been feeling well for a few days prior to presentation. The patient's family became concerned when they checked on patient and she was febrile, somnolent, and confused. There appears to be no aggravating or alleviating factors. Patient denied chest pain, palpitations, shortness of breath, cough, abdominal pain, diarrhea, dysuria, or hematuria. Patient admitted to weakness, fevers, confusion, headache, nausea, and vomiting.     ED vitals: Temp 102.6°F, BP 91/56, , RR 20, SPO2 96% on 2 L/min NC. Sepsis was suspected and the patient received 30 cc/kg fluid bolus, started on Zosyn and vancomycin. E. coli septic shock - Prior to admission. Secondary to UTI. Patient was not fluid responsive and required blood pressure support with norepinephrine upon arrival. The patient was admitted to the ICU. The patient was started on midodrine 5mg three times daily on 21. Discontinued norepinephrine on 21 and after stabilization the patient was transferred to step down unit on 21. Antibiotic course: Vancomycin 1g daily (1/10/21), Zosyn 3.375 every 8 hours (1/10/21-1/12/21), Azithromycin 500mg once (1/11/21), and Cefdinir 300mg once (1/14/21). Patient was discharged home on cefdinir 300mg twice daily for 10 days. Left obstructing nephrolithiasis -  6 millimeter calculus was found on abdominal CT scan located in the left uterovesical junction. Patient was seen by urology and underwent cystoscopy with left-sided stent placement on 1/11/2021 with Dr. Ulisses Hernandez. The patient had her Carrillo catheter removed on 1/13/2021 and given 1 dose Lasix 40 mg IV for suspected fluid overload. The patient subsequently had greater than 8 L of urine output over the following 24 hours. The patient achieved appropriate pain relief from stent placement and diureses. She remained pain-free and hemodynamically stable prior to discharge. Patient was discharged with recommendation to follow-up with urology outpatient. Complicated UTI -urinalysis revealed moderate leukocyte esterase with many bacteria and 50-75 WBC. Cultures grew E. coli sensitive to piperacillin/tazobactam. Patient received piperacillin/tazobactam from 1/10/2021-1/12/2021. Patient was transitioned to cefdinir on 1/14/2021 and discharged home with prescription of cefdinir 300 mg twice daily for 10 days. Cholelithiasis -incidental finding on abdominal CT (1/10/2021) demonstrating a normal volume gallbladder containing multiple small dependent gallstones. There was diffuse gallbladder wall thickening and pericholecystic edema with a normal caliber CBD without intraluminal calcified stone. The patient was recommended for HIDA scan however she declined and preferred to follow-up outpatient. The patient remained asymptomatic from this incidental finding and was discharged home in stable condition. The patient was stable for discharge - all consultants were contacted and in agreement with plan for discharge. Appropriate follow up appointment was arranged prior to discharge. Please see below or view chart for more details from hospital course. Discharge Diagnoses:    · E. coli septic shock   · Nephrolithiasis secondary to obstructing stone  · Complicated E. coli UTI  · Cholelithiasis      The patient was seen and examined on day of discharge and this discharge summary is in conjunction with any daily progress note from day of discharge. Exam:     Vitals:   Vitals:    01/14/21 0423 01/14/21 0429 01/14/21 0630 01/14/21 1008   BP: (!) 149/84   (!) 140/63   Pulse: 62   64   Resp:  16  18   Temp: 98.1 °F (36.7 °C)   98.2 °F (36.8 °C)   TempSrc: Oral   Oral   SpO2: 94%   94%   Weight:   167 lb 4.8 oz (75.9 kg)    Height:   5' 2\" (1.575 m)      Weight: Weight: 167 lb 4.8 oz (75.9 kg)     24 hour intake/output:    Intake/Output Summary (Last 24 hours) at 1/14/2021 1122  Last data filed at 1/14/2021 9829  Gross per 24 hour   Intake 2369 ml   Output 8150 ml   Net -5781 ml       Physical Exam  Vitals signs and nursing note reviewed. Constitutional:       Appearance: Normal appearance. She is obese. HENT:      Head: Normocephalic and atraumatic. Right Ear: External ear normal.      Left Ear: External ear normal.      Nose: Nose normal.      Mouth/Throat:      Mouth: Mucous membranes are moist.      Pharynx: Oropharynx is clear. Eyes:      Conjunctiva/sclera: Conjunctivae normal.      Pupils: Pupils are equal, round, and reactive to light. Neck:      Musculoskeletal: Normal range of motion and neck supple. Cardiovascular:      Rate and Rhythm: Normal rate and regular rhythm. Pulses: Normal pulses. Heart sounds: Normal heart sounds. Pulmonary:      Effort: Pulmonary effort is normal.      Breath sounds: Normal breath sounds.    Abdominal:      General: Bowel sounds are normal. Palpations: Abdomen is soft. Musculoskeletal: Normal range of motion. Skin:     General: Skin is warm and dry. Capillary Refill: Capillary refill takes less than 2 seconds. Neurological:      General: No focal deficit present. Mental Status: She is alert and oriented to person, place, and time. Mental status is at baseline. Psychiatric:         Mood and Affect: Mood normal.         Behavior: Behavior normal.         Thought Content: Thought content normal.         Judgment: Judgment normal.         Labs: For convenience and continuity at follow-up the following most recent labs are provided:      CBC:    Lab Results   Component Value Date    WBC 12.3 01/14/2021    HGB 11.1 01/14/2021    HCT 34.6 01/14/2021     01/14/2021       Renal:    Lab Results   Component Value Date     01/14/2021    K 3.5 01/14/2021     01/14/2021    CO2 19 01/14/2021    BUN 32 01/14/2021    CREATININE 1.5 01/14/2021    CALCIUM 9.9 01/14/2021         Significant Diagnostic Studies    Radiology:   XR CHEST PORTABLE   Final Result   Impression:   Left basilar atelectasis versus infiltrate, new. This document has been electronically signed by: Samantha Pearson MD on    01/13/2021 02:49 AM         US GALLBLADDER RUQ   Final Result   1. Cholelithiasis. Moderate thickening of the gallbladder wall, likely chronic, in light of the negative Segura's sign. 2. Significantly dilated common bile duct. Cannot exclude distal obstructing lesion although I believe such is relatively unlikely, in light of the fact that there are no dilated intrahepatic biliary radicles. MRCP might be considered if further    evaluation desired. .              **This report has been created using voice recognition software. It may contain minor errors which are inherent in voice recognition technology. **      Final report electronically signed by Dr. Eve Goddard on 1/11/2021 10:37 AM      XR CHEST PORTABLE   Final Result Impression:   Satisfactory position of right IJ CVP catheter. No complicating    pneumothorax. Progressive bilateral pneumonitis. This document has been electronically signed by: Maribell Acosta MD on    01/11/2021 04:27 AM         CT ABDOMEN PELVIS WO CONTRAST Additional Contrast? None   Final Result   1. Obstructive 6 mm calculus at the left ureterovesical junction. Mild    to moderate left hydronephrosis and moderate left perinephric stranding. 2.  Extensive cholelithiasis. Gallbladder wall thickening and surrounding    fluid may reflect acute cholecystitis. 3.  Left hemicolon diverticulosis without diverticulitis. 4.  Other chronic findings above. This document has been electronically signed by: Maribell Acosta MD on    01/10/2021 10:22 PM      All CT scans at this facility use dose modulation, iterative    reconstruction, and/or weight-based   dosing when appropriate to reduce radiation dose to as low as reasonably    achievable. XR CHEST PORTABLE   Final Result   1. Mild cardiomegaly. No effusion. 2. Minimal atelectasis/pneumonia right lung base inferomedially. **This report has been created using voice recognition software. It may contain minor errors which are inherent in voice recognition technology. **      Final report electronically signed by Dr. Yuliana Phelps on 1/10/2021 5:55 PM      CT Head WO Contrast   Final Result   No acute intracranial process. **This report has been created using voice recognition software. It may contain minor errors which are inherent in voice recognition technology. **      Final report electronically signed by Dr. Yuliana Phelps on 1/10/2021 5:54 PM             Consults:     IP CONSULT TO UROLOGY  PHARMACY TO DOSE VANCOMYCIN    Disposition:    [x] Home       [] TCU       [] Rehab       [] Psych       [] SNF       [] [place]       [] Other-    Condition at Discharge: stable    Code Status:  Full Code Patient Instructions:    Discharge lab work: None  Activity: activity as tolerated  Diet: DIET GENERAL;      Follow-up visits:   Juli Carrillo MD  [facility]  [address] [address] [phone]    On 1/22/2021  Appointment time is:1:30 pm, Please arrive 15 minutes early, Please bring photo ID, insurance card, medications    Demetris Oleary, APRN - CNP  [facility] [address]  [phone]      office will call you at home to schedule         Discharge Medications:        Medication List      START taking these medications    bisacodyl 5 MG EC tablet  Commonly known as: DULCOLAX  Take 2 tablets by mouth 2 times daily as needed for Constipation     cefdinir 300 MG capsule  Commonly known as: OMNICEF  Take 1 capsule by mouth every 12 hours for 10 days     docusate 100 MG Caps  Commonly known as: COLACE, DULCOLAX  Take 100 mg by mouth daily        CHANGE how you take these medications    Levoxyl 125 MCG tablet  Generic drug: levothyroxine  What changed: Another medication with the same name was removed. Continue taking this medication, and follow the directions you see here. modafinil 200 MG tablet  Commonly known as: PROVIGIL  What changed: Another medication with the same name was removed. Continue taking this medication, and follow the directions you see here.         CONTINUE taking these medications    IRON PO     MULTI-VITAMINS PO        STOP taking these medications    Armodafinil 250 MG Tabs           Where to Get Your Medications      These medications were sent to [facility] , [address] [facility]  [address] [address] [address]st Floor, [address] [address]    Phone: [phone]   · bisacodyl 5 MG EC tablet  · cefdinir 300 MG capsule  · docusate 100 MG Caps         Discharge Time: Time spent on discharge is >35 minutes in the examination, evaluation, counseling, and review of medications and discharge plan. The hospital course was discussed with the patient and all questions and concerns were addressed at that time. The patient was in agreement with and verbalized understanding of the plan of care and had no additional questions or complaints. The patient was instructed to follow-up with any scheduled outpatient appointments or to report to the nearest Emergency Department if new or worsening symptoms should arise. Thank you Jitendra Silverio MD for the opportunity to be involved in this patient's care.     Signed:    Electronically signed by Jelani Roberts DO on 1/14/2021 at 11:22 AM

## 2021-01-14 NOTE — TELEPHONE ENCOUNTER
Patient scheduled for surgery with Dr Darlyn Young on 2/1/21. Surgery consent on arrival. Patient to do Covid 19 on 1/25/21. Patient currently in house will call her upon discharge with the details.

## 2021-01-14 NOTE — PROGRESS NOTES
Patient discharged to home, patient's daughter to bring patient home via private vehicle. Transport takes patient down to discharge lobby via wheelchair. Discharge packet went over with patient and daughter including discharge instructions. Discharge instructions went over with patient include medication instructions, appointment information, and stent information. Patient left with belongings and discharge packet including meds to bed. Patient's questions and concerns were addressed, patient leaves stable and ambulatory.

## 2021-01-14 NOTE — CARE COORDINATION
1/14/21, 11:18 AM EST  Client plans home alone independently as PTA when medically cleared (+) blood culture: E.COLI, Creatinine 1.5, WBC 12.3 improved; monitor, IVF continued) likely in am per report-discussed at rounds  Patient goals/plan/ treatment preferences discussed by  and . Patient goals/plan/ treatment preferences reviewed with patient/ family. Patient/ family verbalize understanding of discharge plan and are in agreement with goal/plan/treatment preferences. Understanding was demonstrated using the teach back method. AVS provided by RN at time of discharge, which includes all necessary medical information pertaining to the patients current course of illness, treatment, post-discharge goals of care, and treatment preferences.         IMM Letter  IMM Letter given to Patient/Family/Significant other/Guardian/POA/by[de-identified] Staff  IMM Letter date given[de-identified] 01/10/21  IMM Letter time given[de-identified] 9964

## 2021-01-14 NOTE — PROGRESS NOTES
CLINICAL PHARMACY: [facility] Select Patient?: No  Total # of Interventions Recommended: 0   -   Total # Interventions Accepted: 0  Intervention Severity:   - Level 1 Intervention Present?: No   - Level 2 #: 0   - Level 3 #: 0   Time Spent (min): 15    Additional Documentation:    Ashlee Valencia, PharmD, BCPS   1/14/2021  11:46 AM

## 2021-01-15 DIAGNOSIS — N20.0 KIDNEY STONE: ICD-10-CM

## 2021-01-15 DIAGNOSIS — Z01.818 PRE-OP TESTING: Primary | ICD-10-CM

## 2021-01-16 LAB — BLOOD CULTURE, ROUTINE: NORMAL

## 2021-01-20 ENCOUNTER — PREP FOR PROCEDURE (OUTPATIENT)
Dept: UROLOGY | Age: 76
End: 2021-01-20

## 2021-01-20 RX ORDER — SODIUM CHLORIDE 9 MG/ML
INJECTION, SOLUTION INTRAVENOUS CONTINUOUS
Status: CANCELLED | OUTPATIENT
Start: 2021-02-01

## 2021-01-25 NOTE — PROGRESS NOTES
Faxed information to anesthesia regarding patient not wanting to have general anesthesia due to a previous bad experienc

## 2021-01-25 NOTE — PROGRESS NOTES
CLINICAL PHARMACY NOTE: MEDS TO [address] [place] [address] Drive Select Patient?: No  Total # of Prescriptions Filled: 2   The following medications were delivered to the patient:  Oxybutynin 5mg  Cefdinir 300mg  Total # of Interventions Completed: 2  Time Spent (min): 30    Additional Documentation:

## 2021-01-25 NOTE — PROGRESS NOTES
Patient voiced frustration about having to come in for testing. States \"I need to get back to work I don't even know if I want to do this\". I let patient know she does need a urine culture to make sure she doesn't have an infection, and we do need a covid test prior to surgery She said she would come to [facility] 1/26 to do the urine and the covid  Covid screening questionnaire complete and negative for symptoms or exposure see chart for documentation.   Please limit your exposure to the public after you have your covid test  Please call your doctor immediately if you develop any symptoms of covid prior to your surgery

## 2021-01-25 NOTE — PROGRESS NOTES
Patient states she had surgery with general anesthetic 4 years ago for kidney stone and has \"never felt well since\". States she felt like she got \"hit by a cristóbal truck\". She said she did fine with surgery on 1/11/21 with MAC, but states she does not want a general anesthetic.  Called Dr. Mcmillan Dress office and spoke with Gab Rubi and she said she would pass the information on to Dr. Melanie Burks

## 2021-01-25 NOTE — PROGRESS NOTES
Patient just discharged from hospital 1/14 so instructions only reviewed.    Following instructions given to patient, who states understanding:    NPO after midnight  [employer] and 's license  Wear comfortable clean clothing  Do not bring jewelry   Shower night before and morning of surgery with a liquid antibacterial soap  Bring medications in original bottles  Follow all instructions given by your physician   needed at discharge  Call [phone] for any questions  Report to [facility] on 2nd floor  If you would become ill prior to surgery, please call the surgeon  May have only 1 visitor accompany you for surgery  Please bring and wear mask  You will be receiving a phone call one or two days before surgery to review covid screening exposure

## 2021-01-26 ENCOUNTER — TELEPHONE (OUTPATIENT)
Dept: UROLOGY | Age: 76
End: 2021-01-26

## 2021-01-26 NOTE — TELEPHONE ENCOUNTER
[address] [facility] Urology Surgery Preop Check Off    Preop testing- done 2 weeks prior and covid testing 1 week prior to surgery date. Continue to give arrival times and inform patients time is subject to change that day as it gets closer to the day of surgery. PREOP check list      Surgeon Dr. Nando Wall       Patient Name  Be Yoon     1945   MRN   [id number]     DOS   21  Diagnosis/Indication for Surgery   Kidney Stone   Procedure Cystoscopy, left ureteroscopy, laser lithotripsy, basket retrieval of stone fragments,  left ureteral stent exchange    Allergies   No Known Allergies   UA  21  Abnormal  Sent to [facility]  Urine Culture  21  Pending  Blood thinners  No     EKG.  1/10/21   Abnormal  Ok per PAT  CXR- 21,21    COVID  21 Pending    Clearance:None  Cardiac-   Medical -       Pre-Admission to surgery- No

## 2021-01-27 ENCOUNTER — HOSPITAL ENCOUNTER (OUTPATIENT)
Age: 76
Discharge: HOME | End: 2021-01-27
Payer: MEDICARE

## 2021-01-27 LAB
BACTERIA: ABNORMAL /HPF
BILIRUBIN URINE: ABNORMAL
BLOOD, URINE: ABNORMAL
CASTS 2: ABNORMAL /LPF
CASTS UA: ABNORMAL /LPF
CHARACTER, URINE: ABNORMAL
COLOR: ABNORMAL
CRYSTALS, UA: ABNORMAL
EPITHELIAL CELLS, UA: ABNORMAL /HPF
GLUCOSE URINE: NEGATIVE MG/DL
ICTOTEST: NEGATIVE
KETONES, URINE: NEGATIVE
LEUKOCYTE ESTERASE, URINE: ABNORMAL
MISCELLANEOUS 2: ABNORMAL
NITRITE, URINE: NEGATIVE
PH UA: 5 (ref 5–9)
PROTEIN UA: 100
RBC URINE: ABNORMAL /HPF
RENAL EPITHELIAL, UA: ABNORMAL
SPECIFIC GRAVITY, URINE: 1.02 (ref 1–1.03)
UROBILINOGEN, URINE: 0.2 EU/DL (ref 0–1)
WBC UA: ABNORMAL /HPF
YEAST: ABNORMAL

## 2021-01-27 PROCEDURE — 81001 URINALYSIS AUTO W/SCOPE: CPT

## 2021-01-27 PROCEDURE — 87086 URINE CULTURE/COLONY COUNT: CPT

## 2021-01-27 PROCEDURE — U0003 INFECTIOUS AGENT DETECTION BY NUCLEIC ACID (DNA OR RNA); SEVERE ACUTE RESPIRATORY SYNDROME CORONAVIRUS 2 (SARS-COV-2) (CORONAVIRUS DISEASE [COVID-19]), AMPLIFIED PROBE TECHNIQUE, MAKING USE OF HIGH THROUGHPUT TECHNOLOGIES AS DESCRIBED BY CMS-2020-01-R: HCPCS

## 2021-01-28 ENCOUNTER — TELEPHONE (OUTPATIENT)
Dept: UROLOGY | Age: 76
End: 2021-01-28

## 2021-01-29 LAB — URINE CULTURE REFLEX: NORMAL

## 2021-02-01 ENCOUNTER — TELEPHONE (OUTPATIENT)
Dept: UROLOGY | Age: 76
End: 2021-02-01

## 2021-02-01 ENCOUNTER — HOSPITAL ENCOUNTER (OUTPATIENT)
Age: 76
Setting detail: OUTPATIENT SURGERY
Discharge: HOME | End: 2021-02-01
Attending: UROLOGY | Admitting: UROLOGY
Payer: MEDICARE

## 2021-02-01 ENCOUNTER — ANESTHESIA EVENT (OUTPATIENT)
Dept: OPERATING ROOM | Age: 76
End: 2021-02-01
Payer: MEDICARE

## 2021-02-01 ENCOUNTER — ANESTHESIA (OUTPATIENT)
Dept: OPERATING ROOM | Age: 76
End: 2021-02-01
Payer: MEDICARE

## 2021-02-01 ENCOUNTER — APPOINTMENT (OUTPATIENT)
Dept: GENERAL RADIOLOGY | Age: 76
End: 2021-02-01
Attending: UROLOGY
Payer: MEDICARE

## 2021-02-01 VITALS
SYSTOLIC BLOOD PRESSURE: 144 MMHG | TEMPERATURE: 97.6 F | RESPIRATION RATE: 18 BRPM | HEART RATE: 73 BPM | DIASTOLIC BLOOD PRESSURE: 93 MMHG | OXYGEN SATURATION: 95 %

## 2021-02-01 VITALS — DIASTOLIC BLOOD PRESSURE: 59 MMHG | OXYGEN SATURATION: 92 % | SYSTOLIC BLOOD PRESSURE: 117 MMHG

## 2021-02-01 DIAGNOSIS — N20.0 KIDNEY STONE: Primary | ICD-10-CM

## 2021-02-01 DIAGNOSIS — N20.1 URETERAL STONE: Primary | ICD-10-CM

## 2021-02-01 PROCEDURE — 6360000002 HC RX W HCPCS: Performed by: NURSE ANESTHETIST, CERTIFIED REGISTERED

## 2021-02-01 PROCEDURE — 2500000003 HC RX 250 WO HCPCS: Performed by: NURSE ANESTHETIST, CERTIFIED REGISTERED

## 2021-02-01 PROCEDURE — 3700000000 HC ANESTHESIA ATTENDED CARE: Performed by: UROLOGY

## 2021-02-01 PROCEDURE — C2617 STENT, NON-COR, TEM W/O DEL: HCPCS | Performed by: UROLOGY

## 2021-02-01 PROCEDURE — 6360000004 HC RX CONTRAST MEDICATION: Performed by: UROLOGY

## 2021-02-01 PROCEDURE — 7100000000 HC PACU RECOVERY - FIRST 15 MIN: Performed by: UROLOGY

## 2021-02-01 PROCEDURE — 82365 CALCULUS SPECTROSCOPY: CPT

## 2021-02-01 PROCEDURE — 3700000001 HC ADD 15 MINUTES (ANESTHESIA): Performed by: UROLOGY

## 2021-02-01 PROCEDURE — 2580000003 HC RX 258: Performed by: UROLOGY

## 2021-02-01 PROCEDURE — 7100000010 HC PHASE II RECOVERY - FIRST 15 MIN: Performed by: UROLOGY

## 2021-02-01 PROCEDURE — C1758 CATHETER, URETERAL: HCPCS | Performed by: UROLOGY

## 2021-02-01 PROCEDURE — C1769 GUIDE WIRE: HCPCS | Performed by: UROLOGY

## 2021-02-01 PROCEDURE — 6360000002 HC RX W HCPCS: Performed by: UROLOGY

## 2021-02-01 PROCEDURE — 7100000011 HC PHASE II RECOVERY - ADDTL 15 MIN: Performed by: UROLOGY

## 2021-02-01 PROCEDURE — 2709999900 HC NON-CHARGEABLE SUPPLY: Performed by: UROLOGY

## 2021-02-01 PROCEDURE — 7100000001 HC PACU RECOVERY - ADDTL 15 MIN: Performed by: UROLOGY

## 2021-02-01 PROCEDURE — 3600000013 HC SURGERY LEVEL 3 ADDTL 15MIN: Performed by: UROLOGY

## 2021-02-01 PROCEDURE — 3600000003 HC SURGERY LEVEL 3 BASE: Performed by: UROLOGY

## 2021-02-01 PROCEDURE — 2720000010 HC SURG SUPPLY STERILE: Performed by: UROLOGY

## 2021-02-01 PROCEDURE — 3209999900 FLUORO FOR SURGICAL PROCEDURES

## 2021-02-01 DEVICE — URETERAL STENT
Type: IMPLANTABLE DEVICE | Site: KIDNEY | Status: FUNCTIONAL
Brand: PERCUFLEX™ PLUS

## 2021-02-01 RX ORDER — PROPOFOL 10 MG/ML
INJECTION, EMULSION INTRAVENOUS CONTINUOUS PRN
Status: DISCONTINUED | OUTPATIENT
Start: 2021-02-01 | End: 2021-02-01 | Stop reason: SDUPTHER

## 2021-02-01 RX ORDER — ONDANSETRON 2 MG/ML
INJECTION INTRAMUSCULAR; INTRAVENOUS PRN
Status: DISCONTINUED | OUTPATIENT
Start: 2021-02-01 | End: 2021-02-01 | Stop reason: SDUPTHER

## 2021-02-01 RX ORDER — PROPOFOL 10 MG/ML
INJECTION, EMULSION INTRAVENOUS PRN
Status: DISCONTINUED | OUTPATIENT
Start: 2021-02-01 | End: 2021-02-01 | Stop reason: SDUPTHER

## 2021-02-01 RX ORDER — DEXAMETHASONE SODIUM PHOSPHATE 10 MG/ML
INJECTION, EMULSION INTRAMUSCULAR; INTRAVENOUS PRN
Status: DISCONTINUED | OUTPATIENT
Start: 2021-02-01 | End: 2021-02-01 | Stop reason: SDUPTHER

## 2021-02-01 RX ORDER — FENTANYL CITRATE 50 UG/ML
INJECTION, SOLUTION INTRAMUSCULAR; INTRAVENOUS PRN
Status: DISCONTINUED | OUTPATIENT
Start: 2021-02-01 | End: 2021-02-01 | Stop reason: SDUPTHER

## 2021-02-01 RX ORDER — TAMSULOSIN HYDROCHLORIDE 0.4 MG/1
0.4 CAPSULE ORAL DAILY
Qty: 21 CAPSULE | Refills: 0 | Status: SHIPPED | OUTPATIENT
Start: 2021-02-01

## 2021-02-01 RX ORDER — SULFAMETHOXAZOLE AND TRIMETHOPRIM 800; 160 MG/1; MG/1
1 TABLET ORAL 2 TIMES DAILY
Qty: 6 TABLET | Refills: 0 | Status: SHIPPED | OUTPATIENT
Start: 2021-02-01 | End: 2021-02-04

## 2021-02-01 RX ORDER — OXYCODONE HYDROCHLORIDE AND ACETAMINOPHEN 5; 325 MG/1; MG/1
1 TABLET ORAL EVERY 6 HOURS PRN
Qty: 10 TABLET | Refills: 0 | Status: SHIPPED | OUTPATIENT
Start: 2021-02-01 | End: 2021-02-04

## 2021-02-01 RX ORDER — LIDOCAINE HCL/PF 100 MG/5ML
SYRINGE (ML) INJECTION PRN
Status: DISCONTINUED | OUTPATIENT
Start: 2021-02-01 | End: 2021-02-01 | Stop reason: SDUPTHER

## 2021-02-01 RX ORDER — SODIUM CHLORIDE 9 MG/ML
INJECTION, SOLUTION INTRAVENOUS CONTINUOUS
Status: DISCONTINUED | OUTPATIENT
Start: 2021-02-01 | End: 2021-02-01 | Stop reason: HOSPADM

## 2021-02-01 RX ADMIN — PROPOFOL 30 MG: 10 INJECTION, EMULSION INTRAVENOUS at 08:21

## 2021-02-01 RX ADMIN — PROPOFOL 100 MG: 10 INJECTION, EMULSION INTRAVENOUS at 08:05

## 2021-02-01 RX ADMIN — SODIUM CHLORIDE: 9 INJECTION, SOLUTION INTRAVENOUS at 07:26

## 2021-02-01 RX ADMIN — PROPOFOL 50 MCG/KG/MIN: 10 INJECTION, EMULSION INTRAVENOUS at 07:55

## 2021-02-01 RX ADMIN — PROPOFOL 50 MG: 10 INJECTION, EMULSION INTRAVENOUS at 07:54

## 2021-02-01 RX ADMIN — CEFAZOLIN 2000 MG: 10 INJECTION, POWDER, FOR SOLUTION INTRAVENOUS at 07:50

## 2021-02-01 RX ADMIN — PROPOFOL 40 MG: 10 INJECTION, EMULSION INTRAVENOUS at 08:03

## 2021-02-01 RX ADMIN — FENTANYL CITRATE 50 MCG: 50 INJECTION, SOLUTION INTRAMUSCULAR; INTRAVENOUS at 07:54

## 2021-02-01 RX ADMIN — DEXAMETHASONE SODIUM PHOSPHATE 4 MG: 10 INJECTION, EMULSION INTRAMUSCULAR; INTRAVENOUS at 08:15

## 2021-02-01 RX ADMIN — Medication 50 MG: at 07:54

## 2021-02-01 RX ADMIN — ONDANSETRON HYDROCHLORIDE 4 MG: 4 INJECTION, SOLUTION INTRAMUSCULAR; INTRAVENOUS at 08:18

## 2021-02-01 ASSESSMENT — PULMONARY FUNCTION TESTS
PIF_VALUE: 0
PIF_VALUE: 11
PIF_VALUE: 11
PIF_VALUE: 3
PIF_VALUE: 14
PIF_VALUE: 0
PIF_VALUE: 2
PIF_VALUE: 2
PIF_VALUE: 0
PIF_VALUE: 3
PIF_VALUE: 0
PIF_VALUE: 9
PIF_VALUE: 2
PIF_VALUE: 11
PIF_VALUE: 11
PIF_VALUE: 1
PIF_VALUE: 0
PIF_VALUE: 13
PIF_VALUE: 0
PIF_VALUE: 2
PIF_VALUE: 1
PIF_VALUE: 10

## 2021-02-01 NOTE — FLOWSHEET NOTE
Pt returned to [facility] room 18. Pt voided on return to [facility]. Vitals and assessment as charted. 0.9 infusing, @400ml to count from PACU. Pt drinking water. Family at the bedside. Pt and family verbalized understanding of discharge criteria and call light use. Call light in reach.

## 2021-02-01 NOTE — H&P
[facility]  Urology H&P Note     Patient:  Nataly Felipe  MRN: [id number]  YOB: 1945    ATTENDING: Deirdre Forman     CHIEF COMPLAINT:  Left kidney stone    HISTORY OF PRESENT ILLNESS:   The patient is a 77 y.o. female who presents with left kidney stone, previously stented    Patient's old records, notes and chart reviewed and summarized above. Past Medical History:    Past Medical History:   Diagnosis Date    Anemia     Kidney stone     Thyroid disease        Past Surgical History:    Past Surgical History:   Procedure Laterality Date    CYSTOSCOPY Left 1/11/2021    CYSTOSCOPY URETERAL LEFT STENT INSERTION performed by Ray Jansen MD at [facility]  5-2015    cystoscopy with right ureteroscopy, laser lithotripsy, basket retrieval of stone fragments and placement of right ureteral stent     Previous Urologic Surgery: none  Medications Prior to Admission:    Prior to Admission medications    Medication Sig Start Date End Date Taking? Authorizing Provider   bisacodyl (DULCOLAX) 5 MG EC tablet Take 2 tablets by mouth 2 times daily as needed for Constipation 1/14/21 2/13/21 Yes Epi Santamaria,    docusate sodium (COLACE, DULCOLAX) 100 MG CAPS Take 100 mg by mouth daily 1/14/21 2/13/21 Yes Epi Santamaria, DO   oxybutynin (DITROPAN) 5 MG tablet Take 1 tablet by mouth 3 times daily as needed (bladder spasms, stent discomfort) 1/14/21  Yes IRMA Puentes CNP   LEVOXYL 125 MCG tablet Take 125 mcg by mouth daily 12/15/20  Yes Historical Provider, MD   modafinil (PROVIGIL) 200 MG tablet Take 200 mg by mouth daily. Yes Historical Provider, MD   Multiple Vitamin (MULTI-VITAMINS PO) Take by mouth daily    Yes Historical Provider, MD   IRON PO Take by mouth daily    Yes Historical Provider, MD       Allergies:  Patient has no known allergies.     Social History:    Social History     Socioeconomic History    Marital status:      Spouse name: Not on file    Number of children: Not on file    Years of education: Not on file    Highest education level: Not on file   Occupational History    Not on file   Social Needs    Financial resource strain: Not on file    Food insecurity     Worry: Not on file     Inability: Not on file    Transportation needs     Medical: Not on file     Non-medical: Not on file   Tobacco Use    Smoking status: Never Smoker    Smokeless tobacco: Never Used   Substance and Sexual Activity    Alcohol use: Yes    Drug use: No    Sexual activity: Not on file   Lifestyle    Physical activity     Days per week: Not on file     Minutes per session: Not on file    Stress: Not on file   Relationships    Social connections     Talks on phone: Not on file     Gets together: Not on file     Attends Mandaen service: Not on file     Active member of club or organization: Not on file     Attends meetings of clubs or organizations: Not on file     Relationship status: Not on file    Intimate partner violence     Fear of current or ex partner: Not on file     Emotionally abused: Not on file     Physically abused: Not on file     Forced sexual activity: Not on file   Other Topics Concern    Not on file   Social History Narrative    Not on file       Family History:  History reviewed. No pertinent family history. Previous Urologic Family history: none  REVIEW OF SYSTEMS:  All systems reviewed and negative except for that already noted in the HPI. Physical Exam:      This a 77 y.o. female   Patient Vitals for the past 24 hrs:   BP Temp Pulse Resp SpO2   02/01/21 0645 (!) 147/114 97.8 °F (36.6 °C) 84 18 98 %     Constitutional: Patient in no acute distress. Neuro: Alert and oriented to person, place and time.   Psych: mood and affect normal  HEENT negative  Lungs: Respiratory effort is normal  Cardiovascular: Normal peripheral pulses  Abdomen: Soft, non-tender, non-distended with no CVA, flank pain

## 2021-02-01 NOTE — OP NOTE
FACILITY:  [address] [place] [address] [address] [place] [address]  1945  [id number]    DATE: 02/01/21  SURGEON:  Dr. Albino Heart MD  ASSISTANT: Dr. Albino Heart MD  PREOPERATIVE DIAGNOSIS: Left sided ureteral stone  POSTOPERATIVE DIAGNOSIS: Left sided ureteral stone  PROCEDURES PERFORMED:  1. Cystoscopy left retrograde pyelogram  2. Left sided ureteroscopy with holmium laser lithotripsy with stone extraction  3. Left sided stent exchange. DRAINS: A Left sided 6x24 double J ureteral stent ( with string)  SPECIMEN: none  ANESTHESIA: General  ESTIMATED BLOOD LOSS: None.   COMPLICATIONS: None.     INDICATIONS FOR PROCEDURE:  Cherri Snow is a 77 y.o. female presents for Left sided calculus. After the risks, benefits, alternatives, of the procedure were discussed with the patient, informed consent was obtained. The patient elected to proceed.     DETAILS OF THE PROCEDURE:  The patient was brought back from the preoperative holding area to the  operating suite, and was transferred to the operating table where the patient lay in  supine position. EPC's were in place, connected to the machine and the machine was turned on before induction. General endotracheal anesthesia was induced, and patient was prepped and draped in standard surgical fashion after being placed in dorsolithotomy position. A proper timeout was performed, preoperative antibiotics were given. We began by inserting a cystoscope with a 22 Bulgarian sheath and 30 degree lens into the patient's urethral meatus and advancing into the bladder without complication. A pan cystoscopy was preformed and the bladder appeared unremarkable. We then focused our attention on the Left ureteral orifice, Left stent was identified and pulled to meatus which we cannulated with our glidewire, advanced up to renal pelvis. We then used a dual lumen catheter to perform a retrograde pyelogram and identified the renal pelvis. We then place a second wire.   Once in good position, we advanced our rigid ureteroscope over the working wire to the distal under direct visualization. We identified the ureteral calculus and using a 365 micron holmium laser fiber we fragmented the calculus. The stone fragments were removed with a 1.9 zero tip basket. At this time a  Proximal ureteroscopy was preformed and no other substantial fragments were identified. We withdrew the ureteroscope and visualized the ureter. No stone fragments were identified. No damage to the ureter was identified. At this time, over the remaining glidewire, we placed a 6x24 cm double J ureteral stent in the usual fashion, and we noted appropriate placement in the upper collecting system using fluoroscopy. There was a good curl noted in the bladder. We decided to leave a string at the end of the stent, which was attached with benzoin and steri-strips. The patient's bladder was drained and removed the scope and the procedure was subsequently terminated.       Plan:  Discharge home in good condition  The patient can pull the stent in 3 days

## 2021-02-01 NOTE — PROGRESS NOTES
ADMITTED TO [facility] AND ORIENTED TO UNIT. SCDS ON. FALL  BANDS ON. PT VERBALIZED APPROVAL FOR FIRST NAME, LAST INITIAL AND PHYSICIAN NAME ON UNIT WHITEBOARD.

## 2021-02-01 NOTE — PROGRESS NOTES
829 Arouses to name on arrival to PACU with O2 on HOB elevated   835 awake and oriented , c/o the need to void but doesn't want to sit on bedpan O2 off   840 pt c/o pain , pt offered medication but doesn't want to delay going bathroom  855 Pt unchanged , continues to want to go to SDS to use bathroom  900 meets criteria for discharge , transported to SDS to ambulate to bathroom ,gait steady

## 2021-02-01 NOTE — PROGRESS NOTES

## 2021-02-01 NOTE — ANESTHESIA PRE PROCEDURE
Department of Anesthesiology  Preprocedure Note       Name:  Dieudonne Lucero   Age:  77 y.o.  :  1945                                          MRN:  [id number]         Date:  2021      Surgeon: Alisa Monsivais):  Cathi Naqvi MD    Procedure: Procedure(s):  CYSTOSCOPY, LEFT URETEROSCOPY, LASER LITHOTRIPSY, BASKET RETRIVAL OF STONE FRAGMENTS, LEFT URETERAL STENT EXCHANGE    Medications prior to admission:   Prior to Admission medications    Medication Sig Start Date End Date Taking? Authorizing Provider   bisacodyl (DULCOLAX) 5 MG EC tablet Take 2 tablets by mouth 2 times daily as needed for Constipation 21 Yes Carlota Seat Hina, DO   docusate sodium (COLACE, DULCOLAX) 100 MG CAPS Take 100 mg by mouth daily 21 Yes Carlota Seat Hina, DO   oxybutynin (DITROPAN) 5 MG tablet Take 1 tablet by mouth 3 times daily as needed (bladder spasms, stent discomfort) 21  Yes IRMA Puentes CNP   LEVOXYL 125 MCG tablet Take 125 mcg by mouth daily 12/15/20  Yes Historical Provider, MD   modafinil (PROVIGIL) 200 MG tablet Take 200 mg by mouth daily.    Yes Historical Provider, MD   Multiple Vitamin (MULTI-VITAMINS PO) Take by mouth daily    Yes Historical Provider, MD   IRON PO Take by mouth daily    Yes Historical Provider, MD       Current medications:    Current Facility-Administered Medications   Medication Dose Route Frequency Provider Last Rate Last Admin    0.9 % sodium chloride infusion   Intravenous Continuous Cathi Naqvi MD        ceFAZolin (ANCEF) 2000 mg in dextrose 5 % 50 mL IVPB  2,000 mg Intravenous 30 Min Pre-Op Cathi Naqvi MD           Allergies:  No Known Allergies    Problem List:    Patient Active Problem List   Diagnosis Code    Ureteral stone N20.1    E. coli septic shock ([facility] A41.51, R65.21    Kidney stone N20.0    Gallstone K80.20    Acute kidney injury ([facility] N17.9    Urinary tract infection with hematuria N39.0, R31.9 Past Medical History:        Diagnosis Date    Anemia     Kidney stone     Thyroid disease        Past Surgical History:        Procedure Laterality Date    CYSTOSCOPY Left 1/11/2021    CYSTOSCOPY URETERAL LEFT STENT INSERTION performed by Mary Mosqueda MD at [address]  5-2015    cystoscopy with right ureteroscopy, laser lithotripsy, basket retrieval of stone fragments and placement of right ureteral stent       Social History:    Social History     Tobacco Use    Smoking status: Never Smoker    Smokeless tobacco: Never Used   Substance Use Topics    Alcohol use: Yes                                Counseling given: Not Answered      Vital Signs (Current):   Vitals:    02/01/21 0645   BP: (!) 147/114   Pulse: 84   Resp: 18   Temp: 97.8 °F (36.6 °C)   SpO2: 98%                                              BP Readings from Last 3 Encounters:   02/01/21 (!) 147/114   01/14/21 (!) 155/103   01/11/21 (!) 82/40       NPO Status:                                                                                 BMI:   Wt Readings from Last 3 Encounters:   01/14/21 167 lb 4.8 oz (75.9 kg)   05/28/15 150 lb (68 kg)   05/19/15 161 lb (73 kg)     There is no height or weight on file to calculate BMI.    CBC:   Lab Results   Component Value Date    WBC 12.3 01/14/2021    RBC 3.83 01/14/2021    HGB 11.1 01/14/2021    HCT 34.6 01/14/2021    MCV 90.3 01/14/2021    RDW 15.4 05/19/2015     01/14/2021       CMP:   Lab Results   Component Value Date     01/14/2021    K 3.5 01/14/2021     01/14/2021    CO2 19 01/14/2021    BUN 32 01/14/2021    CREATININE 1.5 01/14/2021    LABGLOM 34 01/14/2021    GLUCOSE 85 01/14/2021    PROT 5.6 01/14/2021    CALCIUM 9.9 01/14/2021    BILITOT 0.3 01/14/2021    ALKPHOS 202 01/14/2021    AST 24 01/14/2021    ALT 65 01/14/2021       POC Tests: No results for input(s): POCGLU, POCNA, POCK, POCCL, POCBUN, POCHEMO, POCHCT in the last 72 hours. Coags: No results found for: PROTIME, INR, APTT    HCG (If Applicable): No results found for: PREGTESTUR, PREGSERUM, HCG, HCGQUANT     ABGs: No results found for: PHART, PO2ART, HUQ9YRA, ZMX2IEJ, BEART, W3PUOJYX     Type & Screen (If Applicable):  Lab Results   Component Value Date    LABRH POS 01/11/2021       Drug/Infectious Status (If Applicable):  Lab Results   Component Value Date    HEPCAB Negative 01/11/2021       COVID-19 Screening (If Applicable):   Lab Results   Component Value Date    COVID19 Not Detected 01/27/2021         Anesthesia Evaluation  Patient summary reviewed and Nursing notes reviewed no history of anesthetic complications:   Airway: Mallampati: II        Dental:          Pulmonary: breath sounds clear to auscultation                             Cardiovascular:  Exercise tolerance: good (>4 METS),         ECG reviewed  Rhythm: regular  Rate: normal                    Neuro/Psych:               GI/Hepatic/Renal:   (+) renal disease:,           Endo/Other:                     Abdominal:       Abdomen: soft. Vascular:                                        Anesthesia Plan      TIVA     ASA 2       Induction: intravenous. MIPS: Postoperative opioids intended and Prophylactic antiemetics administered. Anesthetic plan and risks discussed with patient and spouse. Plan discussed with CRNA.                   Armida Peralta DO   2/1/2021

## 2021-02-01 NOTE — ANESTHESIA POSTPROCEDURE EVALUATION
Department of Anesthesiology  Postprocedure Note    Patient: Zena Crew  MRN: [id number]  YOB: 1945  Date of evaluation: 2/1/2021  Time:  11:20 AM     Procedure Summary     Date: 02/01/21 Room / Location: [address] [place] [place]    Anesthesia Start: 0750 Anesthesia Stop: 7786    Procedure: CYSTOSCOPY, LEFT URETEROSCOPY, LASER LITHOTRIPSY, BASKET RETRIVAL OF STONE FRAGMENTS, LEFT URETERAL STENT EXCHANGE (Left ) Diagnosis: (KIDNEY STONE)    Surgeons: Marcos Covington MD Responsible Provider: Savage Whitmore DO    Anesthesia Type: TIVA ASA Status: 2          Anesthesia Type: TIVA    Carli Phase I: Carli Score: 10    Carli Phase II: Carli Score: 10    Last vitals: Reviewed and per EMR flowsheets.        Anesthesia Post Evaluation    Patient location during evaluation: PACU  Patient participation: complete - patient participated  Level of consciousness: awake  Airway patency: patent  Nausea & Vomiting: no nausea and no vomiting  Complications: no  Cardiovascular status: hemodynamically stable  Respiratory status: acceptable  Hydration status: stable

## 2021-02-03 LAB — STONE ANALYSIS: NORMAL

## 2021-02-17 ENCOUNTER — TELEPHONE (OUTPATIENT)
Dept: UROLOGY | Age: 76
End: 2021-02-17

## 2021-02-17 LAB
SARS-COV-2: NOT DETECTED
SOURCE: NORMAL

## 2021-03-10 ENCOUNTER — HOSPITAL ENCOUNTER (OUTPATIENT)
Dept: INTERVENTIONAL RADIOLOGY/VASCULAR | Age: 76
Discharge: HOME | End: 2021-03-10
Payer: MEDICARE

## 2021-03-10 ENCOUNTER — HOSPITAL ENCOUNTER (EMERGENCY)
Age: 76
Discharge: HOME | End: 2021-03-10
Attending: EMERGENCY MEDICINE
Payer: MEDICARE

## 2021-03-10 VITALS
HEIGHT: 63 IN | WEIGHT: 157 LBS | RESPIRATION RATE: 18 BRPM | HEART RATE: 83 BPM | BODY MASS INDEX: 27.82 KG/M2 | OXYGEN SATURATION: 97 % | DIASTOLIC BLOOD PRESSURE: 109 MMHG | SYSTOLIC BLOOD PRESSURE: 163 MMHG | TEMPERATURE: 97.5 F

## 2021-03-10 DIAGNOSIS — L53.9 REDNESS: ICD-10-CM

## 2021-03-10 DIAGNOSIS — R60.9 SWELLING: ICD-10-CM

## 2021-03-10 DIAGNOSIS — D64.9 ANEMIA, UNSPECIFIED TYPE: ICD-10-CM

## 2021-03-10 DIAGNOSIS — R20.8 WARM SKIN: ICD-10-CM

## 2021-03-10 DIAGNOSIS — M79.89 LEG SWELLING: Primary | ICD-10-CM

## 2021-03-10 DIAGNOSIS — R22.42 LOCALIZED SWELLING, MASS AND LUMP, LOWER LIMB, LEFT: ICD-10-CM

## 2021-03-10 DIAGNOSIS — I82.402 ACUTE DEEP VEIN THROMBOSIS (DVT) OF LEFT LOWER EXTREMITY, UNSPECIFIED VEIN (HCC): ICD-10-CM

## 2021-03-10 LAB
ALBUMIN SERPL-MCNC: 4.1 G/DL (ref 3.5–5.1)
ALP BLD-CCNC: 146 U/L (ref 38–126)
ALT SERPL-CCNC: 31 U/L (ref 11–66)
ANION GAP SERPL CALCULATED.3IONS-SCNC: 12 MEQ/L (ref 8–16)
APTT: 28.8 SECONDS (ref 22–38)
AST SERPL-CCNC: 35 U/L (ref 5–40)
BASOPHILS # BLD: 0.7 %
BASOPHILS ABSOLUTE: 0 THOU/MM3 (ref 0–0.1)
BILIRUB SERPL-MCNC: 0.2 MG/DL (ref 0.3–1.2)
BUN BLDV-MCNC: 24 MG/DL (ref 7–22)
CALCIUM SERPL-MCNC: 10.5 MG/DL (ref 8.5–10.5)
CHLORIDE BLD-SCNC: 109 MEQ/L (ref 98–111)
CO2: 20 MEQ/L (ref 23–33)
CREAT SERPL-MCNC: 1 MG/DL (ref 0.4–1.2)
EOSINOPHIL # BLD: 1 %
EOSINOPHILS ABSOLUTE: 0.1 THOU/MM3 (ref 0–0.4)
ERYTHROCYTE [DISTWIDTH] IN BLOOD BY AUTOMATED COUNT: 14 % (ref 11.5–14.5)
ERYTHROCYTE [DISTWIDTH] IN BLOOD BY AUTOMATED COUNT: 46.7 FL (ref 35–45)
GFR SERPL CREATININE-BSD FRML MDRD: 54 ML/MIN/1.73M2
GLUCOSE BLD-MCNC: 94 MG/DL (ref 70–108)
HCT VFR BLD CALC: 35.3 % (ref 37–47)
HEMOGLOBIN: 10.9 GM/DL (ref 12–16)
IMMATURE GRANS (ABS): 0.01 THOU/MM3 (ref 0–0.07)
IMMATURE GRANULOCYTES: 0.2 %
INR BLD: 0.96 (ref 0.85–1.13)
LYMPHOCYTES # BLD: 17.2 %
LYMPHOCYTES ABSOLUTE: 1 THOU/MM3 (ref 1–4.8)
MCH RBC QN AUTO: 28.2 PG (ref 26–33)
MCHC RBC AUTO-ENTMCNC: 30.9 GM/DL (ref 32.2–35.5)
MCV RBC AUTO: 91.5 FL (ref 81–99)
MONOCYTES # BLD: 4.9 %
MONOCYTES ABSOLUTE: 0.3 THOU/MM3 (ref 0.4–1.3)
NUCLEATED RED BLOOD CELLS: 0 /100 WBC
OSMOLALITY CALCULATION: 285.1 MOSMOL/KG (ref 275–300)
PLATELET # BLD: 255 THOU/MM3 (ref 130–400)
PMV BLD AUTO: 9.4 FL (ref 9.4–12.4)
POTASSIUM SERPL-SCNC: 3.9 MEQ/L (ref 3.5–5.2)
RBC # BLD: 3.86 MILL/MM3 (ref 4.2–5.4)
SEG NEUTROPHILS: 76 %
SEGMENTED NEUTROPHILS ABSOLUTE COUNT: 4.5 THOU/MM3 (ref 1.8–7.7)
SODIUM BLD-SCNC: 141 MEQ/L (ref 135–145)
TOTAL PROTEIN: 7 G/DL (ref 6.1–8)
WBC # BLD: 5.9 THOU/MM3 (ref 4.8–10.8)

## 2021-03-10 PROCEDURE — 85610 PROTHROMBIN TIME: CPT

## 2021-03-10 PROCEDURE — 93971 EXTREMITY STUDY: CPT

## 2021-03-10 PROCEDURE — 85730 THROMBOPLASTIN TIME PARTIAL: CPT

## 2021-03-10 PROCEDURE — 99284 EMERGENCY DEPT VISIT MOD MDM: CPT

## 2021-03-10 PROCEDURE — 6370000000 HC RX 637 (ALT 250 FOR IP): Performed by: EMERGENCY MEDICINE

## 2021-03-10 PROCEDURE — 85025 COMPLETE CBC W/AUTO DIFF WBC: CPT

## 2021-03-10 PROCEDURE — 36415 COLL VENOUS BLD VENIPUNCTURE: CPT

## 2021-03-10 PROCEDURE — 80053 COMPREHEN METABOLIC PANEL: CPT

## 2021-03-10 RX ORDER — RIVAROXABAN 15 MG-20MG
KIT ORAL
Qty: 1 PACKAGE | Refills: 0 | Status: SHIPPED | OUTPATIENT
Start: 2021-03-10

## 2021-03-10 RX ADMIN — RIVAROXABAN 15 MG: 15 TABLET, FILM COATED ORAL at 15:41

## 2021-03-10 ASSESSMENT — ENCOUNTER SYMPTOMS
TROUBLE SWALLOWING: 0
COUGH: 0
SORE THROAT: 0
SHORTNESS OF BREATH: 0
CHEST TIGHTNESS: 0
BACK PAIN: 0
WHEEZING: 0
VOMITING: 0
NAUSEA: 0
CONSTIPATION: 0
SINUS PRESSURE: 0
VOICE CHANGE: 0
RHINORRHEA: 0
DIARRHEA: 0
ABDOMINAL PAIN: 0

## 2021-03-10 NOTE — ED NOTES
Bed: 008A  Expected date: 3/10/21  Expected time: 1:20 PM  Means of arrival: OTHER  Comments:  Vascular Patient     Jennifer Kline RN  03/10/21 2323

## 2021-03-10 NOTE — ED TRIAGE NOTES
Pt presents to the ED from vascular. Pt states they told her she has a blood clot in her entire left leg. Pt states she has had left leg swelling x6 days. Pt denies pain or difficulty walking.  Pt denies SOB

## 2021-03-10 NOTE — ED PROVIDER NOTES
[facility]        CHIEF COMPLAINT    Chief Complaint   Patient presents with    Leg Swelling     left- just diagnosed with DVT       Nurses Notes reviewed and I agree except as noted in the HPI. HPI    Acosta Mendoza is a 77 y.o. female who presents for evaluation of DVT that was just diagnosed on ultrasound today. The patient went to her primary care provider who had seen the nurse practitioner Stacie Deshpande with Dr. Montgomery Harness office as the patient's been having left leg swelling since 5 days ago. The patient denies any shortness of breath or chest pain, denies any injury or trauma or fall. Patient states that she is sedentary at home since the son  last October she has not been doing a lot plastic Covid pandemic. REVIEW OF SYSTEMS    Review of Systems   Constitutional: Negative for appetite change, chills, diaphoresis, fatigue and fever. HENT: Negative for congestion, ear pain, postnasal drip, rhinorrhea, sinus pressure, sneezing, sore throat, trouble swallowing and voice change. Respiratory: Negative for cough, chest tightness, shortness of breath and wheezing. Cardiovascular: Negative for chest pain, palpitations and leg swelling. Gastrointestinal: Negative for abdominal pain, constipation, diarrhea, nausea and vomiting. Musculoskeletal: Negative for arthralgias, back pain, joint swelling, myalgias, neck pain and neck stiffness. Left lower extremity swelling and pain   Neurological: Negative for dizziness, syncope, weakness, light-headedness, numbness and headaches. PAST MEDICAL HISTORY     has a past medical history of Anemia, Kidney stone, and Thyroid disease. SURGICAL HISTORY   has a past surgical history that includes Kidney stone surgery (5-); Cystoscopy (Left, 2021); and cysto/uretero/pyeloscopy, calculus tx (Left, 2021).     CURRENT MEDICATIONS    Previous Medications IRON PO    Take by mouth daily     LEVOXYL 125 MCG TABLET    Take 125 mcg by mouth daily    MODAFINIL (PROVIGIL) 200 MG TABLET    Take 200 mg by mouth daily. MULTIPLE VITAMIN (MULTI-VITAMINS PO)    Take by mouth daily     OXYBUTYNIN (DITROPAN) 5 MG TABLET    Take 1 tablet by mouth 3 times daily as needed (bladder spasms, stent discomfort)    TAMSULOSIN (FLOMAX) 0.4 MG CAPSULE    Take 1 capsule by mouth daily Take one capsule daily to facilitate passage of ureteral stone       ALLERGIES    has No Known Allergies. FAMILY HISTORY    has no family status information on file. family history is not on file. SOCIAL HISTORY     reports that she has never smoked. She has never used smokeless tobacco. She reports current alcohol use. She reports that she does not use drugs. PHYSICAL EXAM      INITIAL VITALS: BP (!) 163/109 Comment: pt upset and wanting to go home  Pulse 83   Temp 97.5 °F (36.4 °C) (Oral)   Resp 18   Ht 5' 3\" (1.6 m)   Wt 157 lb (71.2 kg)   SpO2 97%   BMI 27.81 kg/m² Estimated body mass index is 27.81 kg/m² as calculated from the following:    Height as of this encounter: 5' 3\" (1.6 m). Weight as of this encounter: 157 lb (71.2 kg). Physical Exam  Vitals signs reviewed. Constitutional:       Appearance: She is well-developed. HENT:      Head: Normocephalic and atraumatic. Right Ear: External ear normal.      Left Ear: External ear normal.      Nose: Nose normal.   Eyes:      General: No scleral icterus. Conjunctiva/sclera: Conjunctivae normal.      Pupils: Pupils are equal, round, and reactive to light. Neck:      Musculoskeletal: Normal range of motion and neck supple. Thyroid: No thyromegaly. Vascular: No JVD. Cardiovascular:      Rate and Rhythm: Normal rate and regular rhythm. Heart sounds: No murmur. No friction rub. Pulmonary:      Effort: Pulmonary effort is normal.      Breath sounds: Normal breath sounds. No wheezing or rales.    Chest:

## 2021-11-17 ENCOUNTER — HOSPITAL ENCOUNTER (OUTPATIENT)
Dept: INTERVENTIONAL RADIOLOGY/VASCULAR | Age: 76
Discharge: HOME | End: 2021-11-17
Payer: MEDICARE

## 2021-11-17 DIAGNOSIS — I82.4Y3 DEEP VEIN THROMBOSIS (DVT) OF PROXIMAL VEIN OF BOTH LOWER EXTREMITIES, UNSPECIFIED CHRONICITY (HCC): ICD-10-CM

## 2021-11-17 PROCEDURE — 93971 EXTREMITY STUDY: CPT

## 2022-09-13 NOTE — ED NOTES
Bed: 009A  Expected date: 1/10/21  Expected time: 5:05 PM  Means of arrival: [facility]  Comments:     Sofia Booker RN  01/10/21 9094
Bedside report provided by Cristobal Garcia to GLORIA Wright. Pt. Resting in bed with even and unlabored respirations. Pt. Denies any pain at this time. Pt. Updated about plan of care and treatment. Family at bedside. Pt. Has no further concerns, questions or needs at this time. Call light within reach.        Shanae Torre RN  01/10/21 1922
ED to inpatient nurses report    Chief Complaint   Patient presents with    Fever    Emesis      Present to ED from home  LOC: alert and orientated to name, place, date  Vital signs   Vitals:    01/10/21 2057 01/10/21 2119 01/10/21 2134 01/10/21 2154   BP: 86/61 93/66 90/64 (!) 91/56   Pulse: 101 99 95 102   Resp: 27 18 16 20   Temp:       TempSrc:       SpO2: 95% 95% 97% 96%   Weight:          Oxygen Baseline room air    Current needs required 2 L nc Bipap/Cpap No  LDAs:   Peripheral IV 05/14/15 Left Hand (Active)       Peripheral IV 01/10/21 Left Hand (Active)   Site Assessment Clean;Dry; Intact 01/10/21 2057   Line Status Infusing 01/10/21 2057   Dressing Status Clean;Dry; Intact 01/10/21 2057     Mobility: Requires assistance * 1  Pending ED orders: none  Present condition: Pt resting in bed with even and unlabored respirations. Pt denies any pain. PT is now afebrile. Pt blood pressure is chronically low and runs in the low 90s. Pt received tylenol. Toradol, 3000 ml fluids, vanc, and zosyn.   Person of contract, phone number   Our promise was given to patient    Electronically signed by Vikki Rosa RN on 1/10/2021 at 10:06 PM       Vikki Rosa RN  01/10/21 2208
Pt medicated per order w/two assist. Daughter in law updated and remains at bedside.       Saadia Cardenas RN  01/10/21 9773
Pt to CT w/RN at bedside.       Jovanna Moore, GLORIA  01/10/21 7504
Pt. Off the floor with tech in stable condition for CT scan.         Raina Rico RN  01/10/21 6945
Pt. Resting in bed with even and unlabored respirations. Pt is now awake,alert and oriented. Pt states she is starting to feel better. Pt states she normally has low blood pressure which runs in the low 02T systolic. Pt. Denies any pain at this time. Pt. Updated about plan of care and treatment. Family at bedside. Pt. Has no further concerns, questions or needs at this time. Call light within reach.         Froylan Rosenthal RN  01/10/21 0010
Pt. Resting in bed with even and unlabored respirations. Pt.resting with eyes closed and lights off. Pt blood pressure is low and pt started on fluids at this time. Pt remains to have a fever. Dr. Rao De Dios notified. Pt. Has no further concerns, questions or needs at this time. Call light within reach.         Angel Parks RN  01/10/21 2022
general

## 2022-12-06 ENCOUNTER — HOSPITAL ENCOUNTER (OUTPATIENT)
Dept: INTERVENTIONAL RADIOLOGY/VASCULAR | Age: 77
Discharge: HOME | End: 2022-12-06
Payer: MEDICARE

## 2022-12-06 DIAGNOSIS — Z86.718 PERSONAL HISTORY OF DEEP VEIN THROMBOSIS: ICD-10-CM

## 2022-12-06 PROCEDURE — 93971 EXTREMITY STUDY: CPT

## 2024-04-16 NOTE — PROGRESS NOTES
Pharmacy Renal Adjustment Per P&T Protocol    Genesis De Leon is a 77 y.o. female. Pharmacy has renally adjusted cefdinir per P&T Protocol. Recent Labs     01/12/21  1525 01/13/21  0349   BUN 44* 37*       Recent Labs     01/12/21  1525 01/13/21  0349   CREATININE 1.8* 1.7*       Estimated Creatinine Clearance: 28 mL/min (A) (based on SCr of 1.7 mg/dL (H)). Height:   Ht Readings from Last 1 Encounters:   01/13/21 5' 2\" (1.575 m)     Weight:  Wt Readings from Last 1 Encounters:   01/13/21 172 lb 6.4 oz (78.2 kg)       Plan: Adjust the following medications based on renal function:           Dose to be 300mg daily per CrCl < 30    Nidia Arauz RP  1/13/2021  7:59 PM 31.9

## 2024-09-13 ENCOUNTER — HOSPITAL ENCOUNTER (EMERGENCY)
Age: 79
Discharge: HOME | End: 2024-09-13
Attending: EMERGENCY MEDICINE
Payer: MEDICARE

## 2024-09-13 ENCOUNTER — APPOINTMENT (OUTPATIENT)
Dept: GENERAL RADIOLOGY | Age: 79
End: 2024-09-13
Payer: MEDICARE

## 2024-09-13 VITALS
DIASTOLIC BLOOD PRESSURE: 92 MMHG | HEART RATE: 80 BPM | SYSTOLIC BLOOD PRESSURE: 115 MMHG | TEMPERATURE: 97.8 F | OXYGEN SATURATION: 98 % | RESPIRATION RATE: 21 BRPM

## 2024-09-13 DIAGNOSIS — E86.0 DEHYDRATION: Primary | ICD-10-CM

## 2024-09-13 DIAGNOSIS — R07.89 ATYPICAL CHEST PAIN: ICD-10-CM

## 2024-09-13 DIAGNOSIS — R19.7 DIARRHEA, UNSPECIFIED TYPE: ICD-10-CM

## 2024-09-13 LAB
ANION GAP SERPL CALC-SCNC: 12 MEQ/L (ref 8–16)
APTT PPP: 26.5 SECONDS (ref 22–38)
BASOPHILS ABSOLUTE: 0 THOU/MM3 (ref 0–0.1)
BASOPHILS NFR BLD AUTO: 0.3 %
BUN SERPL-MCNC: 34 MG/DL (ref 7–22)
CALCIUM SERPL-MCNC: 9.6 MG/DL (ref 8.5–10.5)
CHLORIDE SERPL-SCNC: 111 MEQ/L (ref 98–111)
CO2 SERPL-SCNC: 18 MEQ/L (ref 23–33)
CREAT SERPL-MCNC: 1 MG/DL (ref 0.4–1.2)
DEPRECATED RDW RBC AUTO: 48.6 FL (ref 35–45)
EKG ATRIAL RATE: 67 BPM
EKG P AXIS: 66 DEGREES
EKG P-R INTERVAL: 164 MS
EKG Q-T INTERVAL: 436 MS
EKG QRS DURATION: 86 MS
EKG QTC CALCULATION (BAZETT): 460 MS
EKG R AXIS: -16 DEGREES
EKG T AXIS: 55 DEGREES
EKG VENTRICULAR RATE: 67 BPM
EOSINOPHIL NFR BLD AUTO: 0.3 %
EOSINOPHILS ABSOLUTE: 0 THOU/MM3 (ref 0–0.4)
ERYTHROCYTE [DISTWIDTH] IN BLOOD BY AUTOMATED COUNT: 14.7 % (ref 11.5–14.5)
GFR SERPL CREATININE-BSD FRML MDRD: 58 ML/MIN/1.73M2
GLUCOSE SERPL-MCNC: 124 MG/DL (ref 70–108)
HCT VFR BLD AUTO: 42.5 % (ref 37–47)
HGB BLD-MCNC: 13.2 GM/DL (ref 12–16)
IMM GRANULOCYTES # BLD AUTO: 0.06 THOU/MM3 (ref 0–0.07)
IMM GRANULOCYTES NFR BLD AUTO: 0.5 %
INR PPP: 1.2 (ref 0.85–1.13)
LYMPHOCYTES ABSOLUTE: 0.7 THOU/MM3 (ref 1–4.8)
LYMPHOCYTES NFR BLD AUTO: 5.2 %
MCH RBC QN AUTO: 28.3 PG (ref 26–33)
MCHC RBC AUTO-ENTMCNC: 31.1 GM/DL (ref 32.2–35.5)
MCV RBC AUTO: 91 FL (ref 81–99)
MONOCYTES ABSOLUTE: 0.2 THOU/MM3 (ref 0.4–1.3)
MONOCYTES NFR BLD AUTO: 1.6 %
NEUTROPHILS ABSOLUTE: 11.9 THOU/MM3 (ref 1.8–7.7)
NEUTROPHILS NFR BLD AUTO: 92.1 %
NRBC BLD AUTO-RTO: 0 /100 WBC
NT-PROBNP SERPL IA-MCNC: 444.6 PG/ML (ref 0–449)
OSMOLALITY SERPL CALC.SUM OF ELEC: 290.3 MOSMOL/KG (ref 275–300)
PLATELET # BLD AUTO: 276 THOU/MM3 (ref 130–400)
PMV BLD AUTO: 10.1 FL (ref 9.4–12.4)
POTASSIUM SERPL-SCNC: 4.1 MEQ/L (ref 3.5–5.2)
RBC # BLD AUTO: 4.67 MILL/MM3 (ref 4.2–5.4)
SODIUM SERPL-SCNC: 141 MEQ/L (ref 135–145)
TROPONIN, HIGH SENSITIVITY: 12 NG/L (ref 0–12)
TROPONIN, HIGH SENSITIVITY: 13 NG/L (ref 0–12)
WBC # BLD AUTO: 12.9 THOU/MM3 (ref 4.8–10.8)

## 2024-09-13 PROCEDURE — 93010 ELECTROCARDIOGRAM REPORT: CPT | Performed by: NUCLEAR MEDICINE

## 2024-09-13 PROCEDURE — 84484 ASSAY OF TROPONIN QUANT: CPT

## 2024-09-13 PROCEDURE — 85730 THROMBOPLASTIN TIME PARTIAL: CPT

## 2024-09-13 PROCEDURE — 2580000003 HC RX 258: Performed by: EMERGENCY MEDICINE

## 2024-09-13 PROCEDURE — 83880 ASSAY OF NATRIURETIC PEPTIDE: CPT

## 2024-09-13 PROCEDURE — 85610 PROTHROMBIN TIME: CPT

## 2024-09-13 PROCEDURE — 99285 EMERGENCY DEPT VISIT HI MDM: CPT

## 2024-09-13 PROCEDURE — 80048 BASIC METABOLIC PNL TOTAL CA: CPT

## 2024-09-13 PROCEDURE — 85025 COMPLETE CBC W/AUTO DIFF WBC: CPT

## 2024-09-13 PROCEDURE — 93005 ELECTROCARDIOGRAM TRACING: CPT | Performed by: EMERGENCY MEDICINE

## 2024-09-13 PROCEDURE — 71046 X-RAY EXAM CHEST 2 VIEWS: CPT

## 2024-09-13 PROCEDURE — 96361 HYDRATE IV INFUSION ADD-ON: CPT

## 2024-09-13 PROCEDURE — 96360 HYDRATION IV INFUSION INIT: CPT

## 2024-09-13 PROCEDURE — 36415 COLL VENOUS BLD VENIPUNCTURE: CPT

## 2024-09-13 RX ORDER — SODIUM CHLORIDE, SODIUM LACTATE, POTASSIUM CHLORIDE, AND CALCIUM CHLORIDE .6; .31; .03; .02 G/100ML; G/100ML; G/100ML; G/100ML
1000 INJECTION, SOLUTION INTRAVENOUS ONCE
Status: COMPLETED | OUTPATIENT
Start: 2024-09-13 | End: 2024-09-13

## 2024-09-13 RX ADMIN — SODIUM CHLORIDE, POTASSIUM CHLORIDE, SODIUM LACTATE AND CALCIUM CHLORIDE 1000 ML: 600; 310; 30; 20 INJECTION, SOLUTION INTRAVENOUS at 15:15

## 2025-02-12 ENCOUNTER — TRANSCRIBE ORDERS (OUTPATIENT)
Dept: ADMINISTRATIVE | Age: 80
End: 2025-02-12

## 2025-02-12 DIAGNOSIS — I82.592 CHRONIC EMBOLISM AND THROMBOSIS OF DEEP VEIN OF L LOW EXTREM (HCC): Primary | ICD-10-CM

## 2025-02-12 DIAGNOSIS — Z86.718 HISTORY OF DEEP VENOUS THROMBOSIS (DVT) OF DISTAL VEIN OF LEFT LOWER EXTREMITY: ICD-10-CM

## 2025-03-06 ENCOUNTER — HOSPITAL ENCOUNTER (OUTPATIENT)
Dept: INTERVENTIONAL RADIOLOGY/VASCULAR | Age: 80
Discharge: HOME | End: 2025-03-08
Attending: FAMILY MEDICINE
Payer: MEDICARE

## 2025-03-06 DIAGNOSIS — Z86.718 HISTORY OF DEEP VENOUS THROMBOSIS (DVT) OF DISTAL VEIN OF LEFT LOWER EXTREMITY: ICD-10-CM

## 2025-03-06 PROCEDURE — 93971 EXTREMITY STUDY: CPT

## (undated) DEVICE — SYSTEM PMP VAC SYR 10CC CONT FLO SGL ACT 1 W VLV SAPS

## (undated) DEVICE — GUIDEWIRE URO L150CM DIA0.035IN STIFF NIT HYDRPHLC STR TIP

## (undated) DEVICE — POUCH DRNGE FLX BND INTEGR RAIL CLMP DISP EZ CTCH

## (undated) DEVICE — Z INACTIVE USE 2660664 SOLUTION IRRIG 3000ML 0.9% SOD CHL USP UROMATIC PLAS CONT

## (undated) DEVICE — SOLUTION IV IRRIG WATER 1000ML POUR BRL 2F7114

## (undated) DEVICE — DUAL LUMEN URETERAL CATHETER WITH AQ HYDROPHILIC COATING: Brand: COOK

## (undated) DEVICE — NITINOL STONE RETRIEVAL BASKET: Brand: OPTIFLEX

## (undated) DEVICE — GOWN,SIRUS,NONRNF,SETINSLV,XL,20/CS: Brand: MEDLINE

## (undated) DEVICE — BAG DRNGE (SEE COMMENT) UROLOGY TBL 15.5X31 IN W/HOSE

## (undated) DEVICE — STRIP,CLOSURE,WOUND,MEDI-STRIP,1/2X4: Brand: MEDLINE

## (undated) DEVICE — CYSTO PACK: Brand: MEDLINE INDUSTRIES, INC.

## (undated) DEVICE — SINGLE ACTION PUMPING SYSTEM

## (undated) DEVICE — GLOVE ORANGE PI 7 1/2   MSG9075

## (undated) DEVICE — TUBING, SUCTION, 1/4" X 20', STRAIGHT: Brand: MEDLINE INDUSTRIES, INC.

## (undated) DEVICE — FIBER LSR 365UM HOLM

## (undated) DEVICE — DRAINBAG,ANTI-REFLUX TOWER,L/F,2000ML,LL: Brand: MEDLINE

## (undated) DEVICE — ADAPTER URO SCP UROLOK LL

## (undated) DEVICE — GLOVE ORANGE PI 8   MSG9080

## (undated) DEVICE — DUAL LUMEN URETERAL CATHETER

## (undated) DEVICE — 3M™ STERI-STRIP™ COMPOUND BENZOIN TINCTURE 40 BAGS/CARTON 4 CARTONS/CASE C1544: Brand: 3M™ STERI-STRIP™

## (undated) DEVICE — GUIDEWIRE UROLOGICAL STR 3 CM 0.038 INX150 CM DUAL-FLEX